# Patient Record
Sex: FEMALE | Race: WHITE | NOT HISPANIC OR LATINO | URBAN - METROPOLITAN AREA
[De-identification: names, ages, dates, MRNs, and addresses within clinical notes are randomized per-mention and may not be internally consistent; named-entity substitution may affect disease eponyms.]

---

## 2018-10-12 ENCOUNTER — EMERGENCY (EMERGENCY)
Facility: HOSPITAL | Age: 83
LOS: 1 days | Discharge: HOME | End: 2018-10-12
Admitting: EMERGENCY MEDICINE
Payer: MEDICARE

## 2018-10-12 VITALS
TEMPERATURE: 98 F | OXYGEN SATURATION: 98 % | RESPIRATION RATE: 20 BRPM | SYSTOLIC BLOOD PRESSURE: 117 MMHG | HEART RATE: 76 BPM | DIASTOLIC BLOOD PRESSURE: 68 MMHG
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
## 2018-10-12 DIAGNOSIS — Z91.018 ALLERGY TO OTHER FOODS: ICD-10-CM

## 2018-10-12 DIAGNOSIS — S50.812A ABRASION OF LEFT FOREARM, INITIAL ENCOUNTER: ICD-10-CM
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
## 2018-10-12 DIAGNOSIS — S50.811A ABRASION OF RIGHT FOREARM, INITIAL ENCOUNTER: ICD-10-CM

## 2018-10-12 DIAGNOSIS — Z96.641 PRESENCE OF RIGHT ARTIFICIAL HIP JOINT: Chronic | ICD-10-CM
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
## 2018-10-12 DIAGNOSIS — S42.101A FRACTURE OF UNSPECIFIED PART OF SCAPULA, RIGHT SHOULDER, INITIAL ENCOUNTER FOR CLOSED FRACTURE: ICD-10-CM
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
## 2018-10-12 DIAGNOSIS — S63.501A UNSPECIFIED SPRAIN OF RIGHT WRIST, INITIAL ENCOUNTER: ICD-10-CM

## 2018-10-12 DIAGNOSIS — M25.511 PAIN IN RIGHT SHOULDER: ICD-10-CM

## 2018-10-12 PROCEDURE — 99284 EMERGENCY DEPT VISIT MOD MDM: CPT | Mod: 25,57

## 2018-10-12 PROCEDURE — 73010 X-RAY EXAM OF SHOULDER BLADE: CPT | Mod: 26,RT

## 2018-10-12 PROCEDURE — 73030 X-RAY EXAM OF SHOULDER: CPT | Mod: 26,RT

## 2018-10-12 PROCEDURE — 23570 CLTX SCAPULAR FX W/O MNPJ: CPT | Mod: 54

## 2018-10-12 PROCEDURE — 73110 X-RAY EXAM OF WRIST: CPT | Mod: 26,50

## 2018-10-12 PROCEDURE — 71250 CT THORAX DX C-: CPT | Mod: 26

## 2018-10-12 NOTE — ED PROVIDER NOTE - DIAGNOSTIC INTERPRETATION
Xray (wet reads) interpreted by ALLEN MCCURDY, BANDAR   Xray shoulder/scapular   xray wrist - Xray (wet reads) interpreted by BANDAR CHATTERJEE   Xray shoulder/scapular - ?lucency over the glenoid region, no dislocation, joint space intact, no effusion noted. No foreign body noted   xray wrist - +DJD changes, no soft tissue swelling. no acute fx or dislocation, joint space intact, no effusion noted. No foreign body noted

## 2018-10-12 NOTE — ED PROVIDER NOTE - OBJECTIVE STATEMENT
82 yo F with PMHx of IBS, 84 yo F with PMHx of IBS, not on any AC, last tetanus within 5 yrs, BIBA for R shoulder and scapular and b/l wrist pain s/p fall.  Pt was walking up a ramp to a restaurant, shoes got stuck and fell.  Hit her R shoulder and tried to break her fall with b/l forearms.  Now with pain to the R shoulder, scapular, and b/l wrist region.  Denies prodromes, head trauma, LOC, HA, dizziness, SOB, CP, palpitations, N/V, change in sensation, abdominal/back/neck pain, focal weakness, change in vision/mental status/speech, paresthesia, and malaise. Pt is ambulatory s/p fall.

## 2018-10-12 NOTE — ED PROVIDER NOTE - MEDICAL DECISION MAKING DETAILS
pt with mechanical fall, no associated prodromes, no focal neuro deficits, NV intact, Xray negative for acute fx or bony injury, ACE wrap and wrist splint applied, encouraged RICE to affected region, weight bear as tolerated, f/u ortho, pt and family verbalized understanding. offered pain meds multiple times but pt refused and declined due to "adverse reactions to all pain meds" pt with mechanical fall, no associated prodromes, no focal neuro deficits, NV intact, Xray with ?lucency over R scapular, CT with R scapular fx, no other associated sx or pain on exam, bedside US with negative FAST, shoulder immobilizer applied, ACE wrap and wrist splint applied, encouraged RICE to affected region, weight bear as tolerated, f/u ortho, pt and family verbalized understanding. offered pain meds multiple times but pt refused and declined due to "adverse reactions to all pain meds"

## 2018-10-12 NOTE — ED ADULT NURSE NOTE - NSIMPLEMENTINTERV_GEN_ALL_ED
Implemented All Universal Safety Interventions:  [place] to call system. Call bell, personal items and telephone within reach. Instruct patient to call for assistance. Room bathroom lighting operational. Non-slip footwear when patient is off stretcher. Physically safe environment: no spills, clutter or unnecessary equipment. Stretcher in lowest position, wheels locked, appropriate side rails in place.

## 2018-10-12 NOTE — ED PROVIDER NOTE - CARE PROVIDER_API CALL
Zak Quiros), Orthopaedic Surgery  [address]  [address] [place] [address]  Phone: [phone]  Fax: [phone]    your pmd,   Phone: (   )    -  Fax: (   )    -

## 2018-10-12 NOTE — ED PROVIDER NOTE - PROGRESS NOTE DETAILS
offered pain meds on arrival, but pt declined all pain meds and reports adverse reactions to all pain meds, given ice pack, will proceed with xrays and supportive care

## 2018-10-12 NOTE — ED PROVIDER NOTE - CARE PLAN
Principal Discharge DX:	Shoulder sprain  Secondary Diagnosis:	Wrist sprain  Secondary Diagnosis:	Abrasion Principal Discharge DX:	Scapular fracture  Secondary Diagnosis:	Wrist sprain  Secondary Diagnosis:	Abrasion

## 2018-10-12 NOTE — ED PROVIDER NOTE - NS ED NOTE AC HIGH RISK COUNTRIES
received patient to rm 12 with c/o generalized body pain. hx sickle cell disease. reported at triage he took 8mg dilaudid po before coming to ed.
No

## 2018-10-12 NOTE — ED PROVIDER NOTE - PHYSICAL EXAMINATION
Vital Signs - nursing notes reviewed and confirmed  Gen - WDWN, NAD, comfortable and non-toxic appearing, speaking in full sentences   Skin - warm, dry, abrasion to b/l wrist/forearm region, no laceration onted   HEENT - AT/NC, PERRL, EOMI, no conjunctival injection, moist oral mucosa, TM intact b/l with good cone of lights, o/p clear with no erythema, edema, or exudate, uvula midline, airway patent, neck supple and NT, FROM  CV - S1S2, R/R/R  Resp - respiration non-labored, CTAB, symmetric bs b/l, no r/r/w  GI - NABS, soft, ND, NT, no rebound or guarding, no CVAT b/l   MS - w/w/p, R shoulder +edema and TTP posterior scapular and shoulder region, no erythema, ecchymosis, crepitus, joint laxity, or deformity, restricted ROM 2/2 pain, NV intact. +SILT, symmetric palpable distal pulses b/l , calves supple and NT, distal pulses symmetric b/l, brisk cap refills, +SILT  Neuro - AxOx3, no focal neuro deficits, CN II-XII grossly intact, cerebellar function intact, negative pronator drift, negative nystagmus, ambulatory without gait disturbance

## 2018-10-24 PROBLEM — Z00.00 ENCOUNTER FOR PREVENTIVE HEALTH EXAMINATION: Status: ACTIVE | Noted: 2018-10-24

## 2018-10-24 PROBLEM — K58.9 IRRITABLE BOWEL SYNDROME WITHOUT DIARRHEA: Chronic | Status: ACTIVE | Noted: 2018-10-12

## 2018-10-26 ENCOUNTER — APPOINTMENT (OUTPATIENT)
Dept: ORTHOPEDIC SURGERY | Facility: CLINIC | Age: 83
End: 2018-10-26
Payer: MEDICARE

## 2018-10-26 VITALS — WEIGHT: 92 LBS | RESPIRATION RATE: 16 BRPM | HEIGHT: 62 IN | BODY MASS INDEX: 16.93 KG/M2

## 2018-10-26 DIAGNOSIS — Z82.62 FAMILY HISTORY OF OSTEOPOROSIS: ICD-10-CM

## 2018-10-26 DIAGNOSIS — Z80.9 FAMILY HISTORY OF MALIGNANT NEOPLASM, UNSPECIFIED: ICD-10-CM

## 2018-10-26 DIAGNOSIS — Z87.09 PERSONAL HISTORY OF OTHER DISEASES OF THE RESPIRATORY SYSTEM: ICD-10-CM

## 2018-10-26 DIAGNOSIS — Z78.9 OTHER SPECIFIED HEALTH STATUS: ICD-10-CM

## 2018-10-26 DIAGNOSIS — Z87.39 PERSONAL HISTORY OF OTHER DISEASES OF THE MUSCULOSKELETAL SYSTEM AND CONNECTIVE TISSUE: ICD-10-CM

## 2018-10-26 DIAGNOSIS — F19.90 OTHER PSYCHOACTIVE SUBSTANCE USE, UNSPECIFIED, UNCOMPLICATED: ICD-10-CM

## 2018-10-26 PROCEDURE — 99203 OFFICE O/P NEW LOW 30 MIN: CPT

## 2018-11-04 ENCOUNTER — MOBILE ON CALL (OUTPATIENT)
Age: 83
End: 2018-11-04

## 2018-11-09 ENCOUNTER — APPOINTMENT (OUTPATIENT)
Dept: FAMILY MEDICINE | Facility: CLINIC | Age: 83
End: 2018-11-09
Payer: MEDICARE

## 2018-11-09 VITALS
DIASTOLIC BLOOD PRESSURE: 74 MMHG | BODY MASS INDEX: 17.79 KG/M2 | HEART RATE: 77 BPM | HEIGHT: 60.63 IN | SYSTOLIC BLOOD PRESSURE: 115 MMHG | OXYGEN SATURATION: 98 % | TEMPERATURE: 98.2 F | WEIGHT: 93 LBS

## 2018-11-09 DIAGNOSIS — M54.5 LOW BACK PAIN: ICD-10-CM

## 2018-11-09 DIAGNOSIS — M25.551 PAIN IN RIGHT HIP: ICD-10-CM

## 2018-11-09 PROCEDURE — 36415 COLL VENOUS BLD VENIPUNCTURE: CPT

## 2018-11-09 PROCEDURE — 99204 OFFICE O/P NEW MOD 45 MIN: CPT | Mod: 25

## 2018-11-09 NOTE — PHYSICAL EXAM
[No Acute Distress] : no acute distress [Well-Appearing] : well-appearing [Normal Sclera/Conjunctiva] : normal sclera/conjunctiva [Normal Outer Ear/Nose] : the outer ears and nose were normal in appearance [Supple] : supple [No Respiratory Distress] : no respiratory distress  [Clear to Auscultation] : lungs were clear to auscultation bilaterally [Normal Rate] : normal rate  [Regular Rhythm] : with a regular rhythm [Soft] : abdomen soft [Non Tender] : non-tender [Non-distended] : non-distended [No Masses] : no abdominal mass palpated [No HSM] : no HSM [Normal Bowel Sounds] : normal bowel sounds [No CVA Tenderness] : no CVA  tenderness [No Spinal Tenderness] : no spinal tenderness [No Joint Swelling] : no joint swelling [No Rash] : no rash [Coordination Grossly Intact] : coordination grossly intact [No Focal Deficits] : no focal deficits [Normal Affect] : the affect was normal [de-identified] : 2+ bilateral pitting edema [de-identified] : a [de-identified] : walking with cane

## 2018-11-09 NOTE — REVIEW OF SYSTEMS
[Diarrhea] : diarrhea [Muscle Pain] : muscle pain [Back Pain] : back pain [Negative] : Heme/Lymph [FreeTextEntry9] : LE swelling

## 2018-11-09 NOTE — HISTORY OF PRESENT ILLNESS
[FreeTextEntry8] : 82 yo female here for establishment of care.\par \par Patient slipped and fell on her right hip in the winter. Had surgery done of the right hip in January. Has surgery at [facility], will sign for records. Still with mobility issues since that time. Has 24 hour aid currently to help her. Recently in the last week she has noticed also that she has low back pain, which has been making it hard to get around. \par \par Patient also with IBS and chronic diarrhea. Has had accidents where she lost control of stool in the past. Also suffered with a parasitic infection in the past. Is going to make follow up appointment with her GI. Can have up to 10-15 episodes of diarrhea with some blood at times. \par \par Also with LE edema. The swelling has been worse recently since she's been less mobile. No CP or SOB.

## 2018-11-13 LAB
ALBUMIN SERPL ELPH-MCNC: 2.9 G/DL
ALP BLD-CCNC: 111 U/L
ALT SERPL-CCNC: 12 U/L
ANION GAP SERPL CALC-SCNC: 8 MMOL/L
AST SERPL-CCNC: 22 U/L
BASOPHILS # BLD AUTO: 0.01 K/UL
BASOPHILS NFR BLD AUTO: 0.1 %
BILIRUB SERPL-MCNC: 0.2 MG/DL
BUN SERPL-MCNC: 20 MG/DL
CALCIUM SERPL-MCNC: 8.7 MG/DL
CHLORIDE SERPL-SCNC: 99 MMOL/L
CO2 SERPL-SCNC: 28 MMOL/L
CREAT SERPL-MCNC: 0.54 MG/DL
EOSINOPHIL # BLD AUTO: 0.01 K/UL
EOSINOPHIL NFR BLD AUTO: 0.1 %
FERRITIN SERPL-MCNC: 38 NG/ML
FOLATE SERPL-MCNC: 16.4 NG/ML
GLUCOSE SERPL-MCNC: 81 MG/DL
HCT VFR BLD CALC: 30.9 %
HGB BLD-MCNC: 9 G/DL
IMM GRANULOCYTES NFR BLD AUTO: 0.4 %
IRON SATN MFR SERPL: 7 %
IRON SERPL-MCNC: 20 UG/DL
LYMPHOCYTES # BLD AUTO: 2.59 K/UL
LYMPHOCYTES NFR BLD AUTO: 21.3 %
MAN DIFF?: NORMAL
MCHC RBC-ENTMCNC: 26.4 PG
MCHC RBC-ENTMCNC: 29.1 GM/DL
MCV RBC AUTO: 90.6 FL
MONOCYTES # BLD AUTO: 0.99 K/UL
MONOCYTES NFR BLD AUTO: 8.1 %
NEUTROPHILS # BLD AUTO: 8.52 K/UL
NEUTROPHILS NFR BLD AUTO: 70 %
PLATELET # BLD AUTO: 356 K/UL
POTASSIUM SERPL-SCNC: 4 MMOL/L
PROT SERPL-MCNC: 6.6 G/DL
RBC # BLD: 3.41 M/UL
RBC # BLD: 3.43 M/UL
RBC # FLD: 17.4 %
RETICS # AUTO: 2.1 %
RETICS AGGREG/RBC NFR: 73.1 K/UL
SODIUM SERPL-SCNC: 135 MMOL/L
TIBC SERPL-MCNC: 278 UG/DL
TRANSFERRIN SERPL-MCNC: 198 MG/DL
UIBC SERPL-MCNC: 258 UG/DL
VIT B12 SERPL-MCNC: 1192 PG/ML
WBC # FLD AUTO: 12.17 K/UL

## 2018-11-14 LAB — G LAMBLIA AG STL QL: NORMAL

## 2018-11-15 LAB — DEPRECATED O AND P PREP STL: NORMAL

## 2018-11-16 LAB
BACTERIA STL CULT: NORMAL
E.COLI SHIGA TOXIN: NEGATIVE

## 2018-11-27 ENCOUNTER — APPOINTMENT (OUTPATIENT)
Dept: HEART AND VASCULAR | Facility: CLINIC | Age: 83
End: 2018-11-27

## 2019-01-04 ENCOUNTER — MOBILE ON CALL (OUTPATIENT)
Age: 84
End: 2019-01-04

## 2019-01-09 ENCOUNTER — MOBILE ON CALL (OUTPATIENT)
Age: 84
End: 2019-01-09

## 2019-01-10 ENCOUNTER — APPOINTMENT (OUTPATIENT)
Dept: GASTROENTEROLOGY | Facility: CLINIC | Age: 84
End: 2019-01-10

## 2019-01-10 RX ORDER — FERROUS SULFATE 300 MG/5ML
300 (60 FE) SOLUTION ORAL TWICE DAILY
Qty: 1 | Refills: 3 | Status: ACTIVE | COMMUNITY
Start: 2019-01-10 | End: 1900-01-01

## 2019-01-10 RX ORDER — CHLORHEXIDINE GLUCONATE 4 %
325 (65 FE) LIQUID (ML) TOPICAL
Qty: 60 | Refills: 3 | Status: DISCONTINUED | COMMUNITY
Start: 2018-11-13 | End: 2019-01-10

## 2019-01-11 ENCOUNTER — APPOINTMENT (OUTPATIENT)
Dept: HEART AND VASCULAR | Facility: CLINIC | Age: 84
End: 2019-01-11

## 2019-01-15 ENCOUNTER — APPOINTMENT (OUTPATIENT)
Dept: FAMILY MEDICINE | Facility: CLINIC | Age: 84
End: 2019-01-15
Payer: MEDICARE

## 2019-01-15 VITALS
WEIGHT: 96 LBS | DIASTOLIC BLOOD PRESSURE: 68 MMHG | BODY MASS INDEX: 18.85 KG/M2 | HEART RATE: 81 BPM | HEIGHT: 60 IN | OXYGEN SATURATION: 97 % | SYSTOLIC BLOOD PRESSURE: 109 MMHG

## 2019-01-15 PROCEDURE — 99214 OFFICE O/P EST MOD 30 MIN: CPT

## 2019-01-15 NOTE — HISTORY OF PRESENT ILLNESS
[de-identified] : 82 yo female here for follow up. \par \par Patient has h/o multiple falls in the past. Patient stating she saw ortho who advised her to walk tall and as normally as possible at home and only use the cane outside of her home. Patient stating that recently she has had an aid coming earlier in the morning to help her get around the house and getting dressed, which has helped. Here with aid today. Patient got new shoes with thick soles, which seems to help her get around as well. Also working on getting an automatic elevator soon.\par \par Stating that her diarrhea has improved. Had GI stool panel which was negative. Hasn't seen GI yet since last visit.\par \par Patient with anemia stable at last visit. Hasn't taken iron yet because was waiting on solution to arrive at pharmacy. Patient hasn't seen Heme yet.\par \par Patient still with LE swelling. Hasn't gone yet for LE dopplers.

## 2019-01-15 NOTE — PHYSICAL EXAM
[No Acute Distress] : no acute distress [Well-Appearing] : well-appearing [Normal Sclera/Conjunctiva] : normal sclera/conjunctiva [Normal Outer Ear/Nose] : the outer ears and nose were normal in appearance [Supple] : supple [No Respiratory Distress] : no respiratory distress  [Clear to Auscultation] : lungs were clear to auscultation bilaterally [Normal Rate] : normal rate  [Regular Rhythm] : with a regular rhythm [No CVA Tenderness] : no CVA  tenderness [No Spinal Tenderness] : no spinal tenderness [No Joint Swelling] : no joint swelling [No Rash] : no rash [Coordination Grossly Intact] : coordination grossly intact [No Focal Deficits] : no focal deficits [Normal Affect] : the affect was normal [de-identified] : 2+ bilateral pitting edema [de-identified] : walking with cane

## 2019-01-28 ENCOUNTER — APPOINTMENT (OUTPATIENT)
Dept: HEART AND VASCULAR | Facility: CLINIC | Age: 84
End: 2019-01-28

## 2019-01-30 ENCOUNTER — MOBILE ON CALL (OUTPATIENT)
Age: 84
End: 2019-01-30

## 2019-02-04 ENCOUNTER — APPOINTMENT (OUTPATIENT)
Dept: FAMILY MEDICINE | Facility: CLINIC | Age: 84
End: 2019-02-04
Payer: MEDICARE

## 2019-02-04 VITALS
DIASTOLIC BLOOD PRESSURE: 79 MMHG | SYSTOLIC BLOOD PRESSURE: 122 MMHG | TEMPERATURE: 98.6 F | BODY MASS INDEX: 19.01 KG/M2 | OXYGEN SATURATION: 96 % | HEART RATE: 90 BPM | WEIGHT: 96.8 LBS | HEIGHT: 60 IN

## 2019-02-04 DIAGNOSIS — S42.101A FRACTURE OF UNSPECIFIED PART OF SCAPULA, RIGHT SHOULDER, INITIAL ENCOUNTER FOR CLOSED FRACTURE: ICD-10-CM

## 2019-02-04 DIAGNOSIS — D64.9 ANEMIA, UNSPECIFIED: ICD-10-CM

## 2019-02-04 PROCEDURE — 99214 OFFICE O/P EST MOD 30 MIN: CPT

## 2019-02-04 NOTE — HISTORY OF PRESENT ILLNESS
[de-identified] : 84 yo female here for follow up. \par \par Patient has h/o multiple mechanical falls in the past. Is seeing an integrative physician who wants her to have a CT of her head. No head trauma. Patient will send records from this physician. Patient stating that recently she has had an aid coming earlier in the morning to help her get around the house and getting dressed, which has helped. Here with aid today. Patient did have a fall back in October and had a CT chest which showed right scapular fx. No longer with pain there, but admitting to occasional R sided rib pain since then. No SOB or CP.\par \par Stating that her diarrhea has improved. Had GI stool panel which was negative. Has appt with GI tomorrow.\par \par Patient with anemia stable at last visit. Patient only just started on iron tabs. Patient hasn't seen Heme yet.\par \par Patient still with LE swelling. Hasn't gone yet for LE dopplers.

## 2019-02-04 NOTE — PHYSICAL EXAM
[No Acute Distress] : no acute distress [Well-Appearing] : well-appearing [Normal Sclera/Conjunctiva] : normal sclera/conjunctiva [Normal Outer Ear/Nose] : the outer ears and nose were normal in appearance [Supple] : supple [No Respiratory Distress] : no respiratory distress  [Clear to Auscultation] : lungs were clear to auscultation bilaterally [Normal Rate] : normal rate  [Regular Rhythm] : with a regular rhythm [No CVA Tenderness] : no CVA  tenderness [No Spinal Tenderness] : no spinal tenderness [No Joint Swelling] : no joint swelling [No Rash] : no rash [Coordination Grossly Intact] : coordination grossly intact [No Focal Deficits] : no focal deficits [Normal Affect] : the affect was normal [de-identified] : 2+ bilateral pitting edema [de-identified] : minimal pain with palpation of right 8-10th ribs, anteriorly [de-identified] : walking with cane

## 2019-02-05 ENCOUNTER — NON-APPOINTMENT (OUTPATIENT)
Age: 84
End: 2019-02-05

## 2019-02-05 ENCOUNTER — APPOINTMENT (OUTPATIENT)
Dept: HEART AND VASCULAR | Facility: CLINIC | Age: 84
End: 2019-02-05
Payer: MEDICARE

## 2019-02-05 VITALS
WEIGHT: 96 LBS | SYSTOLIC BLOOD PRESSURE: 120 MMHG | HEART RATE: 82 BPM | HEIGHT: 60 IN | DIASTOLIC BLOOD PRESSURE: 81 MMHG | OXYGEN SATURATION: 97 % | BODY MASS INDEX: 18.85 KG/M2

## 2019-02-05 DIAGNOSIS — R53.83 OTHER FATIGUE: ICD-10-CM

## 2019-02-05 PROCEDURE — 99204 OFFICE O/P NEW MOD 45 MIN: CPT

## 2019-02-05 NOTE — REASON FOR VISIT
[Initial Evaluation] : an initial evaluation of [Chest Pain] : chest pain [Dyspnea] : dyspnea [Fatigue] : feeling tired (fatigue) [FreeTextEntry1] : 83 year old female presents to initiate care. She does have a touch of edema b/l. She is a poor historian. She has had prior testing. However, we do not have results available. No chest discomfort. However, she remarks on fatigue. She is also short of breath. This is often noted with activity. Symptoms always improve at rest.\par

## 2019-02-05 NOTE — PHYSICAL EXAM
[General Appearance - Well Developed] : well developed [Normal Appearance] : normal appearance [Well Groomed] : well groomed [General Appearance - Well Nourished] : well nourished [No Deformities] : no deformities [General Appearance - In No Acute Distress] : no acute distress [Normal Conjunctiva] : the conjunctiva exhibited no abnormalities [Eyelids - No Xanthelasma] : the eyelids demonstrated no xanthelasmas [Normal Oral Mucosa] : normal oral mucosa [No Oral Pallor] : no oral pallor [No Oral Cyanosis] : no oral cyanosis [Normal Jugular Venous A Waves Present] : normal jugular venous A waves present [Normal Jugular Venous V Waves Present] : normal jugular venous V waves present [No Jugular Venous Jo A Waves] : no jugular venous jo A waves [Heart Rate And Rhythm] : heart rate and rhythm were normal [Heart Sounds] : normal S1 and S2 [Murmurs] : no murmurs present [Respiration, Rhythm And Depth] : normal respiratory rhythm and effort [Exaggerated Use Of Accessory Muscles For Inspiration] : no accessory muscle use [Auscultation Breath Sounds / Voice Sounds] : lungs were clear to auscultation bilaterally [Abdomen Soft] : soft [Abdomen Tenderness] : non-tender [Abdomen Mass (___ Cm)] : no abdominal mass palpated [Abnormal Walk] : normal gait [Gait - Sufficient For Exercise Testing] : the gait was sufficient for exercise testing [Nail Clubbing] : no clubbing of the fingernails [Cyanosis, Localized] : no localized cyanosis [Petechial Hemorrhages (___cm)] : no petechial hemorrhages [] : no ischemic changes [Oriented To Time, Place, And Person] : oriented to person, place, and time [Affect] : the affect was normal [Mood] : the mood was normal [No Anxiety] : not feeling anxious

## 2019-02-08 ENCOUNTER — APPOINTMENT (OUTPATIENT)
Dept: GASTROENTEROLOGY | Facility: CLINIC | Age: 84
End: 2019-02-08
Payer: MEDICARE

## 2019-02-08 ENCOUNTER — APPOINTMENT (OUTPATIENT)
Dept: HEART AND VASCULAR | Facility: CLINIC | Age: 84
End: 2019-02-08
Payer: MEDICARE

## 2019-02-08 VITALS
OXYGEN SATURATION: 95 % | WEIGHT: 96 LBS | TEMPERATURE: 98.6 F | SYSTOLIC BLOOD PRESSURE: 111 MMHG | HEART RATE: 82 BPM | BODY MASS INDEX: 18.85 KG/M2 | DIASTOLIC BLOOD PRESSURE: 76 MMHG | HEIGHT: 60 IN

## 2019-02-08 DIAGNOSIS — Z87.19 PERSONAL HISTORY OF OTHER DISEASES OF THE DIGESTIVE SYSTEM: ICD-10-CM

## 2019-02-08 DIAGNOSIS — R14.0 ABDOMINAL DISTENSION (GASEOUS): ICD-10-CM

## 2019-02-08 DIAGNOSIS — K92.1 MELENA: ICD-10-CM

## 2019-02-08 PROCEDURE — 93306 TTE W/DOPPLER COMPLETE: CPT

## 2019-02-08 PROCEDURE — 99204 OFFICE O/P NEW MOD 45 MIN: CPT | Mod: 25

## 2019-02-08 PROCEDURE — 36415 COLL VENOUS BLD VENIPUNCTURE: CPT

## 2019-02-08 RX ORDER — RIFAXIMIN 550 MG/1
550 TABLET ORAL
Qty: 42 | Refills: 0 | Status: ACTIVE | COMMUNITY
Start: 2019-02-08 | End: 1900-01-01

## 2019-02-08 RX ORDER — CHOLESTYRAMINE 4 G/9G
4 POWDER, FOR SUSPENSION ORAL TWICE DAILY
Qty: 360 | Refills: 5 | Status: ACTIVE | COMMUNITY
Start: 2019-02-08 | End: 1900-01-01

## 2019-02-19 RX ORDER — IMMUNE GLOB/PLASMA FRA BOVINE 5 G
5 POWDER IN PACKET (EA) ORAL
Qty: 60 | Refills: 2 | Status: ACTIVE | COMMUNITY
Start: 2018-09-06

## 2019-03-06 ENCOUNTER — APPOINTMENT (OUTPATIENT)
Dept: HEART AND VASCULAR | Facility: CLINIC | Age: 84
End: 2019-03-06
Payer: MEDICARE

## 2019-03-06 VITALS
SYSTOLIC BLOOD PRESSURE: 110 MMHG | BODY MASS INDEX: 18.85 KG/M2 | HEIGHT: 60 IN | HEART RATE: 89 BPM | DIASTOLIC BLOOD PRESSURE: 69 MMHG | OXYGEN SATURATION: 97 % | WEIGHT: 96 LBS | TEMPERATURE: 98.2 F

## 2019-03-06 DIAGNOSIS — Z80.0 FAMILY HISTORY OF MALIGNANT NEOPLASM OF DIGESTIVE ORGANS: ICD-10-CM

## 2019-03-06 DIAGNOSIS — R07.89 OTHER CHEST PAIN: ICD-10-CM

## 2019-03-06 DIAGNOSIS — Z83.79 FAMILY HISTORY OF OTHER DISEASES OF THE DIGESTIVE SYSTEM: ICD-10-CM

## 2019-03-06 DIAGNOSIS — R06.00 DYSPNEA, UNSPECIFIED: ICD-10-CM

## 2019-03-06 PROCEDURE — 99214 OFFICE O/P EST MOD 30 MIN: CPT

## 2019-03-07 PROBLEM — R07.89 ATYPICAL CHEST PAIN: Status: ACTIVE | Noted: 2019-03-07

## 2019-03-07 NOTE — REASON FOR VISIT
[Follow-Up - Clinic] : a clinic follow-up of [Chest Pain] : chest pain [FreeTextEntry1] : Notes atypical chest discomfort. This was noted after being positioned in a certain way. Echo findings were normal. No dyspnea or palpitations noted. No syncope.

## 2019-03-07 NOTE — PHYSICAL EXAM
[General Appearance - Well Developed] : well developed [Normal Appearance] : normal appearance [Well Groomed] : well groomed [General Appearance - Well Nourished] : well nourished [No Deformities] : no deformities [General Appearance - In No Acute Distress] : no acute distress [Normal Conjunctiva] : the conjunctiva exhibited no abnormalities [Eyelids - No Xanthelasma] : the eyelids demonstrated no xanthelasmas [Normal Oral Mucosa] : normal oral mucosa [No Oral Pallor] : no oral pallor [No Oral Cyanosis] : no oral cyanosis [Normal Jugular Venous A Waves Present] : normal jugular venous A waves present [Normal Jugular Venous V Waves Present] : normal jugular venous V waves present [No Jugular Venous Jo A Waves] : no jugular venous jo A waves [Respiration, Rhythm And Depth] : normal respiratory rhythm and effort [Exaggerated Use Of Accessory Muscles For Inspiration] : no accessory muscle use [Auscultation Breath Sounds / Voice Sounds] : lungs were clear to auscultation bilaterally [Heart Rate And Rhythm] : heart rate and rhythm were normal [Heart Sounds] : normal S1 and S2 [Murmurs] : no murmurs present [Abdomen Soft] : soft [Abdomen Tenderness] : non-tender [Abdomen Mass (___ Cm)] : no abdominal mass palpated [Abnormal Walk] : normal gait [Gait - Sufficient For Exercise Testing] : the gait was sufficient for exercise testing [Nail Clubbing] : no clubbing of the fingernails [Cyanosis, Localized] : no localized cyanosis [Petechial Hemorrhages (___cm)] : no petechial hemorrhages [] : no ischemic changes [Oriented To Time, Place, And Person] : oriented to person, place, and time [Affect] : the affect was normal [Mood] : the mood was normal [No Anxiety] : not feeling anxious

## 2019-03-07 NOTE — DISCUSSION/SUMMARY
[FreeTextEntry1] : Inclined towards a conservative follow up in this patient. We had a careful discussion regarding diet and exercise. Will be happy to re-evaluate.\par

## 2019-03-18 ENCOUNTER — MOBILE ON CALL (OUTPATIENT)
Age: 84
End: 2019-03-18

## 2019-03-18 ENCOUNTER — MEDICATION RENEWAL (OUTPATIENT)
Age: 84
End: 2019-03-18

## 2019-03-22 ENCOUNTER — APPOINTMENT (OUTPATIENT)
Dept: GASTROENTEROLOGY | Facility: CLINIC | Age: 84
End: 2019-03-22

## 2019-03-29 ENCOUNTER — APPOINTMENT (OUTPATIENT)
Dept: FAMILY MEDICINE | Facility: CLINIC | Age: 84
End: 2019-03-29
Payer: MEDICARE

## 2019-03-29 VITALS
SYSTOLIC BLOOD PRESSURE: 125 MMHG | DIASTOLIC BLOOD PRESSURE: 84 MMHG | BODY MASS INDEX: 17.94 KG/M2 | HEIGHT: 60 IN | HEART RATE: 85 BPM | OXYGEN SATURATION: 98 % | WEIGHT: 91.38 LBS | TEMPERATURE: 97.2 F

## 2019-03-29 DIAGNOSIS — M79.89 OTHER SPECIFIED SOFT TISSUE DISORDERS: ICD-10-CM

## 2019-03-29 DIAGNOSIS — R14.0 ABDOMINAL DISTENSION (GASEOUS): ICD-10-CM

## 2019-03-29 DIAGNOSIS — R19.7 DIARRHEA, UNSPECIFIED: ICD-10-CM

## 2019-03-29 DIAGNOSIS — R41.3 OTHER AMNESIA: ICD-10-CM

## 2019-03-29 DIAGNOSIS — R29.898 OTHER SYMPTOMS AND SIGNS INVOLVING THE MUSCULOSKELETAL SYSTEM: ICD-10-CM

## 2019-03-29 PROCEDURE — 99214 OFFICE O/P EST MOD 30 MIN: CPT

## 2019-03-29 NOTE — HISTORY OF PRESENT ILLNESS
[de-identified] : 84 yo female with PMH of iron deficiency anemia, small intestinal bacterial overgrowth, asthma, LE edema, fatigue, memory impairment, muscle deconditioning, and IBS here for evaluation of abdominal distension. Patient's RN called because patient was having severe abdominal distension. Advised patient to go to the ED but she refused going. Admitting to daily diarrhea every hour. Tried outpatient stool testing, but patient didn't provide all samples. Stating she has h/o parasitic infection in the past which was treated. Admitting to abdominal bloating and discomfort, as well as back pain. Admitting to occasional blood in stool. Last BM 1 hour ago.\par \par Patient with chronic LE edema. Have tried to get patient to go for LE Doppler, but has difficulty getting there. Gets around with walker and home aid. Had echo 2/8/19 showing EF 60-65%, grade I diastolic CHF, mild aortic regurg. When trying to have clinical coordinator assist her with appointments or VNS services visit her, patient doesn't always answer the phone or door.\par \par Spoke with patients daughter earlier this month. Patient's daughter, Patricia Mcmahon [phone]. Daughter stating that her mom fired all her home aids and had been living at home alone recently. Daughter is HCP and contacted another home aid service. They evaluated her and stated that there were feces over the walls, patient has increased LE swelling, and fridge is at temperature too high to preserve food. Patricia also stating that she has also had worsening mental status and dementia.

## 2019-03-29 NOTE — REVIEW OF SYSTEMS
[Abdominal Pain] : abdominal pain [Diarrhea] : diarrhea [Negative] : Heme/Lymph [FreeTextEntry9] : LE swelling

## 2019-03-29 NOTE — PHYSICAL EXAM
[No Acute Distress] : no acute distress [Well-Appearing] : well-appearing [Normal Sclera/Conjunctiva] : normal sclera/conjunctiva [Normal Outer Ear/Nose] : the outer ears and nose were normal in appearance [Supple] : supple [No Respiratory Distress] : no respiratory distress  [Clear to Auscultation] : lungs were clear to auscultation bilaterally [Normal Rate] : normal rate  [Regular Rhythm] : with a regular rhythm [No CVA Tenderness] : no CVA  tenderness [No Spinal Tenderness] : no spinal tenderness [No Joint Swelling] : no joint swelling [No Rash] : no rash [Coordination Grossly Intact] : coordination grossly intact [No Focal Deficits] : no focal deficits [Speech Grossly Normal] : speech grossly normal [Normal Affect] : the affect was normal [de-identified] : 2+ bilateral pitting edema [de-identified] : abdomen very distended, mild tenderness [de-identified] : walking with walker [de-identified] : Patient has circuitous train of thought, very forgetful

## 2019-04-01 ENCOUNTER — MOBILE ON CALL (OUTPATIENT)
Age: 84
End: 2019-04-01

## 2019-04-08 ENCOUNTER — APPOINTMENT (OUTPATIENT)
Dept: FAMILY MEDICINE | Facility: CLINIC | Age: 84
End: 2019-04-08

## 2019-04-08 ENCOUNTER — INPATIENT (INPATIENT)
Facility: HOSPITAL | Age: 84
LOS: 3 days | Discharge: HOME HEALTH | DRG: 377 | End: 2019-04-12
Attending: STUDENT IN AN ORGANIZED HEALTH CARE EDUCATION/TRAINING PROGRAM | Admitting: STUDENT IN AN ORGANIZED HEALTH CARE EDUCATION/TRAINING PROGRAM
Payer: MEDICARE

## 2019-04-08 VITALS
HEART RATE: 74 BPM | WEIGHT: 91.05 LBS | TEMPERATURE: 98 F | DIASTOLIC BLOOD PRESSURE: 68 MMHG | SYSTOLIC BLOOD PRESSURE: 112 MMHG | OXYGEN SATURATION: 94 % | RESPIRATION RATE: 16 BRPM

## 2019-04-08 DIAGNOSIS — Z96.641 PRESENCE OF RIGHT ARTIFICIAL HIP JOINT: Chronic | ICD-10-CM

## 2019-04-08 LAB
ALBUMIN SERPL ELPH-MCNC: 1.9 G/DL — LOW (ref 3.4–5)
ALP SERPL-CCNC: 114 U/L — SIGNIFICANT CHANGE UP (ref 40–120)
ALT FLD-CCNC: 16 U/L — SIGNIFICANT CHANGE UP (ref 12–42)
ANION GAP SERPL CALC-SCNC: 9 MMOL/L — SIGNIFICANT CHANGE UP (ref 9–16)
APPEARANCE UR: CLEAR — SIGNIFICANT CHANGE UP
APTT BLD: 30.8 SEC — SIGNIFICANT CHANGE UP (ref 27.5–36.3)
AST SERPL-CCNC: 16 U/L — SIGNIFICANT CHANGE UP (ref 15–37)
BASOPHILS NFR BLD AUTO: 0.2 % — SIGNIFICANT CHANGE UP (ref 0–2)
BILIRUB SERPL-MCNC: 0.2 MG/DL — SIGNIFICANT CHANGE UP (ref 0.2–1.2)
BILIRUB UR-MCNC: NEGATIVE — SIGNIFICANT CHANGE UP
BUN SERPL-MCNC: 16 MG/DL — SIGNIFICANT CHANGE UP (ref 7–23)
CALCIUM SERPL-MCNC: 7.9 MG/DL — LOW (ref 8.5–10.5)
CHLORIDE SERPL-SCNC: 104 MMOL/L — SIGNIFICANT CHANGE UP (ref 96–108)
CO2 SERPL-SCNC: 25 MMOL/L — SIGNIFICANT CHANGE UP (ref 22–31)
COLOR SPEC: YELLOW — SIGNIFICANT CHANGE UP
CREAT SERPL-MCNC: 0.6 MG/DL — SIGNIFICANT CHANGE UP (ref 0.5–1.3)
DIFF PNL FLD: ABNORMAL
EOSINOPHIL NFR BLD AUTO: 0.1 % — SIGNIFICANT CHANGE UP (ref 0–6)
GLUCOSE SERPL-MCNC: 116 MG/DL — HIGH (ref 70–99)
GLUCOSE UR QL: NEGATIVE — SIGNIFICANT CHANGE UP
HCT VFR BLD CALC: 28.6 % — LOW (ref 34.5–45)
HGB BLD-MCNC: 8.3 G/DL — LOW (ref 11.5–15.5)
IMM GRANULOCYTES NFR BLD AUTO: 1.1 % — SIGNIFICANT CHANGE UP (ref 0–1.5)
INR BLD: 1.02 — SIGNIFICANT CHANGE UP (ref 0.88–1.16)
KETONES UR-MCNC: NEGATIVE — SIGNIFICANT CHANGE UP
LEUKOCYTE ESTERASE UR-ACNC: NEGATIVE — SIGNIFICANT CHANGE UP
LIDOCAIN IGE QN: 109 U/L — SIGNIFICANT CHANGE UP (ref 73–393)
LYMPHOCYTES # BLD AUTO: 13.3 % — SIGNIFICANT CHANGE UP (ref 13–44)
MCHC RBC-ENTMCNC: 25.1 PG — LOW (ref 27–34)
MCHC RBC-ENTMCNC: 29 G/DL — LOW (ref 32–36)
MCV RBC AUTO: 86.4 FL — SIGNIFICANT CHANGE UP (ref 80–100)
MONOCYTES NFR BLD AUTO: 17.5 % — HIGH (ref 2–14)
NEUTROPHILS NFR BLD AUTO: 67.8 % — SIGNIFICANT CHANGE UP (ref 43–77)
NITRITE UR-MCNC: NEGATIVE — SIGNIFICANT CHANGE UP
OB PNL STL: POSITIVE
PH UR: 6.5 — SIGNIFICANT CHANGE UP (ref 5–8)
PLATELET # BLD AUTO: 288 K/UL — SIGNIFICANT CHANGE UP (ref 150–400)
POTASSIUM SERPL-MCNC: 3.8 MMOL/L — SIGNIFICANT CHANGE UP (ref 3.5–5.3)
POTASSIUM SERPL-SCNC: 3.8 MMOL/L — SIGNIFICANT CHANGE UP (ref 3.5–5.3)
PROT SERPL-MCNC: 6.2 G/DL — LOW (ref 6.4–8.2)
PROT UR-MCNC: NEGATIVE MG/DL — SIGNIFICANT CHANGE UP
PROTHROM AB SERPL-ACNC: 11.3 SEC — SIGNIFICANT CHANGE UP (ref 10–12.9)
RBC # BLD: 3.31 M/UL — LOW (ref 3.8–5.2)
RBC # FLD: 17.2 % — HIGH (ref 10.3–14.5)
SODIUM SERPL-SCNC: 138 MMOL/L — SIGNIFICANT CHANGE UP (ref 132–145)
SP GR SPEC: 1.01 — SIGNIFICANT CHANGE UP (ref 1–1.03)
UROBILINOGEN FLD QL: 0.2 E.U./DL — SIGNIFICANT CHANGE UP
WBC # BLD: 11.1 K/UL — HIGH (ref 3.8–10.5)
WBC # FLD AUTO: 11.1 K/UL — HIGH (ref 3.8–10.5)

## 2019-04-08 PROCEDURE — 99223 1ST HOSP IP/OBS HIGH 75: CPT | Mod: GC

## 2019-04-08 PROCEDURE — 99285 EMERGENCY DEPT VISIT HI MDM: CPT

## 2019-04-08 RX ORDER — SODIUM CHLORIDE 9 MG/ML
1000 INJECTION INTRAMUSCULAR; INTRAVENOUS; SUBCUTANEOUS ONCE
Qty: 0 | Refills: 0 | Status: COMPLETED | OUTPATIENT
Start: 2019-04-08 | End: 2019-04-08

## 2019-04-08 RX ORDER — PANTOPRAZOLE SODIUM 20 MG/1
8 TABLET, DELAYED RELEASE ORAL
Qty: 80 | Refills: 0 | Status: DISCONTINUED | OUTPATIENT
Start: 2019-04-08 | End: 2019-04-08

## 2019-04-08 RX ADMIN — SODIUM CHLORIDE 1000 MILLILITER(S): 9 INJECTION INTRAMUSCULAR; INTRAVENOUS; SUBCUTANEOUS at 12:54

## 2019-04-08 RX ADMIN — PANTOPRAZOLE SODIUM 10 MG/HR: 20 TABLET, DELAYED RELEASE ORAL at 14:02

## 2019-04-08 NOTE — ED PROVIDER NOTE - NS ED ROS FT
Other than symptoms associated with present events the following is reported:  General:  No fever, no chills, no weight loss. + generalized weakness  HEENT:  No sore throat.  Respiratory: No cough, no dyspnea, no wheeze.  Cardiovascular:  No chest pain, no palpitations, no orthopnea.  GI: No abdominal pain, no nausea/vomiting, no diarrhea. +bloody stools. no melena  : No dysuria, no frequency, no urgency.  Musculoskeletal:  No joint pain, no myalgia.  Endocrine:  No generalized weakness, no polyuria.  Neurological:  No headache, no focal weakness.   Psychiatric: No emotional stress, no depression.  Derm:  No rash.  Heme:  No bruising, no bleeding.

## 2019-04-08 NOTE — ED PROVIDER NOTE - CLINICAL SUMMARY MEDICAL DECISION MAKING FREE TEXT BOX
Pt with BRBPR passing clots. C/o generalized weakness. Given heparin at BI 3 days ago. Will make pt NPO. Will get cbc, cmp, lipase, AU, INR. will get guaiac. PPI drip started. Likely admission for anemia and GI bleeding. Will also need a neuropysch workup while admitted given PCP (Dr. Al) concern for worsening dementia and safety at home.

## 2019-04-08 NOTE — ED PROVIDER NOTE - PROGRESS NOTE DETAILS
hgb8.3- dropped from last 9 (prior to iron supplementation). 2x bloody stools here- BRB in toilet with small red clots. abd exam benign. Will admit to medicine- discussed with Dr. Gardner via transfer center. Pt is accepted to [facility].

## 2019-04-08 NOTE — ED PROVIDER NOTE - OBJECTIVE STATEMENT
84 y/o F     pmhx: IBS, GI bleed, IDAnemia, asthma, LE edema, memory impairment  -sent last week to BI for abd distension/pain 3/29 - heparinized for edematous legs while admitted for DVT prophylaxis   last CBC 11/2018 hgb 9    Pt sent to Ed for GI bleeding. 82 y/o F     pmhx: IBS, GI bleed, IDAnemia, asthma, LE edema, memory impairment  -sent last week to  for abd distension/pain 3/29 - heparinized for edematous legs while admitted for DVT prophylaxis   last CBC 11/2018 hgb 9    Pt sent to Ed for GI bleeding. Discussed with PCP this AM that she was feeling generalized weakness and that she had been noting BRB in toilet bowl with red clots. Pt known to be anemic. Of note was at  for 3 days for admission for Abd pain and distension on 3/29. During that time was heparinized for DVT prophylaxis. Pt states bleeding began the day after she was discharged from . Discussed case with Dr. Al - PCP- states pt also needs to be admitted for a full neuro psychology workup for worsening dementia and confusion. Pt has no other medical complaint at this time. Denies abd pain, fevers, chills, n/v/d/c, cp, sob, grissom, palpitations, lightheadedness

## 2019-04-08 NOTE — ED ADULT NURSE REASSESSMENT NOTE - NS ED NURSE REASSESS COMMENT FT1
pt alert and awake, no complaints at this time, aide at bedside. protonix drip running as per orders.

## 2019-04-08 NOTE — ED PROVIDER NOTE - PHYSICAL EXAMINATION
VITAL SIGNS: I have reviewed nursing notes and confirm.  CONSTITUTIONAL: Well-developed; cachetic appearing; in no acute distress.  SKIN: Pale. Skin is warm and dry, no acute rash.  HEAD: Normocephalic; atraumatic.  EYES: PERRL, EOM intact; conjunctiva and sclera clear.  ENT: No nasal discharge; airway clear.  NECK: Supple; non tender.  CARD: S1, S2 normal; no murmurs, gallops, or rubs. Regular rate and rhythm.  RESP: No wheezes, rales or rhonchi.  ABD: Normal bowel sounds; soft; non-distended; non-tender; no hepatosplenomegaly.  RECTAL EXAM: no visible or palpable internal/external hemorrhoids. + bright red blood in rectal vault- +small clots. no oozing or active bleeding.   EXT: Normal ROM. No clubbing, cyanosis or edema.  NEURO: Alert, oriented. Grossly unremarkable.  PSYCH: agitated, easily confused.

## 2019-04-08 NOTE — ED PROVIDER NOTE - ATTENDING CONTRIBUTION TO CARE
Patient seen with ALLEN Mcgovern in bed 22 of [facility] and to be admitted to [facility]

## 2019-04-08 NOTE — ED ADULT NURSE REASSESSMENT NOTE - NS ED NURSE REASSESS COMMENT FT1
Patient received from SUSANNA Ash. IV Protonix infusing well. Patient noted with Stage 2 Sacral Ulcer 2 x 2 and +1 pitting Edema gita legs. NAD will cont to monitor

## 2019-04-08 NOTE — ED ADULT NURSE NOTE - PRIMARY CARE PROVIDER
[facility] Medicine Services  PROGRESS NOTE    Patient Name: Leo Elias  : 1936  MRN: [id number]    Date of Admission: 2019  Length of Stay: 1  Primary Care Physician: Provider, No Known    Subjective   Subjective     CC:  Fever of 103 on admission    HPI:  Wife Cate in room today.  Feels better.  They say he has already been seen by Surgeon.  Afebrile today but was febrile on admission.  Leukocytosis improved    Review of Systems    Gen- No fevers, chills  CV- No chest pain, palpitations  Resp- No cough, dyspnea  GI- No N/V/D, abd pain    Otherwise ROS is negative except as mentioned in the HPI.    Objective   Objective     Vital Signs:   Temp:  [98.1 °F (36.7 °C)-103 °F (39.4 °C)] 98.1 °F (36.7 °C)  Heart Rate:  [] 79  Resp:  [18-20] 20  BP: ()/(47-89) 111/74     Physical Exam:    Constitutional: No acute distress, awake, alert  HENT: NCAT, mucous membranes moist  Respiratory: Clear to auscultation bilaterally, respiratory effort normal   Cardiovascular: RRR, no murmurs, rubs, or gallops, palpable pedal pulses bilaterally  Gastrointestinal: Positive bowel sounds, soft, nontender, nondistended  Musculoskeletal: no edema, left ankle cellulitis with heel ulcer  Psychiatric: Appropriate affect, cooperative  Neurologic: Oriented x 3, strength symmetric in all extremities, Cranial Nerves grossly intact to confrontation, speech clear  Skin: No rashes    Results Reviewed:  I have personally reviewed current lab, radiology, and data and agree.    Results from last 7 days   Lab Units  19   0526  19   1827   WBC 10*3/mm3  12.30*  18.20*   HEMOGLOBIN g/dL  10.0*  10.6*   HEMATOCRIT %  30.7*  32.3*   PLATELETS 10*3/mm3  221  270     Results from last 7 days   Lab Units  19   0526  19   1827   SODIUM mmol/L  133  132   POTASSIUM mmol/L  4.0  3.8   CHLORIDE mmol/L  106  104   CO2 mmol/L  18.0*  18.0*   BUN mg/dL  80*  86*   CREATININE mg/dL  2.31*   2.38*   GLUCOSE mg/dL  95  170*   CALCIUM mg/dL  8.2*  8.4*   ALT (SGPT) U/L   --   10   AST (SGOT) U/L   --   13     Estimated Creatinine Clearance: 31.7 mL/min (A) (by C-G formula based on SCr of 2.31 mg/dL (H)).    No results found for: BNP    Microbiology Results Abnormal     Procedure Component Value - Date/Time    Blood Culture - Blood, Arm, Right [[id number]] Collected:  01/12/19 1904    Lab Status:  Preliminary result Specimen:  Blood from Arm, Right Updated:  01/13/19 0800     Blood Culture No growth at less than 24 hours    Blood Culture - Blood, Arm, Left [[id number]] Collected:  01/12/19 1904    Lab Status:  Preliminary result Specimen:  Blood from Arm, Left Updated:  01/13/19 0800     Blood Culture No growth at less than 24 hours    Urine Culture - Urine, Urine, Catheter In/Out [[id number]]  (Normal) Collected:  01/12/19 1921    Lab Status:  Preliminary result Specimen:  Urine, Catheter In/Out Updated:  01/13/19 0709     Urine Culture No growth    Influenza A & B, RT PCR - Swab, Nasopharynx [[id number]]  (Normal) Collected:  01/12/19 1842    Lab Status:  Final result Specimen:  Swab from Nasopharynx Updated:  01/1936     Influenza A PCR Not Detected     Influenza B PCR Not Detected        Imaging Results (last 24 hours)     Procedure Component Value Units Date/Time    CT Lower Extremity Left Without Contrast [[id number]] Collected:  01/13/19 0149     Updated:  01/13/19 0256    Narrative:       EXAM:    CT Left Lower Extremity Without IV Contrast, Foot     EXAM DATE/TIME:    1/13/2019 1:49 AM     CLINICAL HISTORY:    82 years old, male; Sepsis, unspecified organism; Pleural effusion, not   elsewhere classified; Constipation, unspecified; Pressure ulcer of left heel,   stage 3; Urinary tract infection, site not specified; Other ascites; Fever,   unspecified; Pain; Foot; Additional info: Left foot wound, R/O osteo     TECHNIQUE:    CT of the Left lower extremity without intravenous contrast was performed.    Exam focused on the foot.    All CT scans at this facility use at least one of these dose optimization   techniques: automated exposure control; mA and/or kV adjustment per patient   size (includes targeted exams where dose is matched to clinical indication); or   iterative reconstruction.    Coronal and sagittal reformatted images were created and reviewed.     COMPARISON:    No relevant prior studies available.     FINDINGS:    Bones/joints:  Inferior and superior calcaneal osteophytes. DJD of ankle   joint, subtalar joint, multiple mid-tarsal joints, multiple MTP joints, several   IP joints of toes. Some generalized osseous demineralization. Some scattered   degenerative subarticular cysts of ankle and multiple bones of the foot. No   obvious large osseous erosions, but subtle acute demineralization abnormality   could be obscured. If further study is desired, then consider MRI with/without   IV contrast. No dislocation or obvious acute fracture. Degenerative changes and   some scattered demineralization of right ankle and foot, as visualized. Hallux   valgus- metatarsus varus deformity.    Soft tissues:  Skin ulcer with skin irregularity, granular calcifications, and   thickening along posterior heel. Edema/swelling of left ankle and foot. Cannot   exclude cellulitis at heel.  Correlation with CT contrast study or ultrasound   could help rule out any small fluid pocket within the swollen soft tissues of   the heel-distal Achilles region, if desired..  Achilles tendon appears grossly   continuous, but MR correlation would be more precise.    Vasculature:  Atherosclerotic placques scattered along some vessels.       Impression:       1. Skin ulcer with skin irregularity and thickening along posterior heel.    Cannot exclude cellulitis. See above.  2. Soft tissue swelling of ankle and foot.   3. Inferior and superior calcaneal osteophytes.   4. DJD of ankle joint, subtalar joint, multiple mid-tarsal joints,  NONAFFILIATED multiple MTP   joints, several IP joints of toes.   5. Some generalized osseous demineralization.  Also, see above.  Sheath   6. No dislocation or obvious acute fracture.   7. Hallux valgus- metatarsus varus deformity.   8. Scattered atherosclerosis.  9.  Additional findings, as above.     THIS DOCUMENT HAS BEEN ELECTRONICALLY SIGNED BY HOLDEN BACON MD    CT Abdomen Pelvis Without Contrast [[id number]] Collected:  01/12/19 1945     Updated:  01/12/19 2153    Addenda:        Note: Critical Findings were discussed with Jacky Roy at 1/12/2019   9:49   PM EST. The report was understood and acknowledged by the healthcare   giver, who   stated patient has elevated WBCs, no history of recent fall or   anticoagulants.    THIS DOCUMENT HAS BEEN ELECTRONICALLY SIGNED BY HOLDEN BACON MD  Signed:  01/12/19 2153 by Holden Bacon MD    Narrative:       EXAM:    CT Abdomen and Pelvis Without Contrast     EXAM DATE/TIME:    1/12/2019 7:45 PM     CLINICAL HISTORY:    82 years old, male; Pain; Abdominal pain; Generalized; Prior surgery;   Additional info: Abdominal distention with vomiting and fever     TECHNIQUE:    Axial computed tomography images of the abdomen and pelvis without contrast.    All CT scans at this facility use at least one of these dose optimization   techniques: automated exposure control; mA and/or kV adjustment per patient   size (includes targeted exams where dose is matched to clinical indication); or   iterative reconstruction.    Coronal reformatted images were created and reviewed.     COMPARISON:    No relevant prior studies available.     FINDINGS:    Lower thorax:  Old granulomatous disease of lung(s). There are coronary artery   atheromatous calcifications, consistent with CAD. Dystrophic calcifications on   the mitral anulus. Normal heart size. Moderate right pleural effusion. Possible   tiny noncalcified 2 mm nodules versus vessels at lateral right lower lobe   base.( For  patients at low risk (minimal or absent history of smoking and of   other known risk factors), no routine follow-up is indicated. For patients at   high risk (history of smoking or of other known risk factors), consider   optional CT at 12 months. (Magy et al., Fleischner Society, 2017).).    Scattered bibasilar subsegmental atelectasis. Mild RLL compressive atelectasis   and/or infiltrate. Some punctate high density possible debris or additional   calcifications in base of right lower lobe. Small hiatal hernia.     ABDOMEN:    Liver:  Evidence of old granulomatous disease of the liver. No hepatomegaly.    Gallbladder and bile ducts: Unremarkable. No calcified stones. No ductal   dilation.    Pancreas:  Pancreatic atrophy.    Spleen: Vascular calcifications. No splenomegaly.    Adrenals:  Borderline in fullness of bilateral adrenals.    Kidneys and ureters:  Mild bilateral perinephric cobwebs/edema. Mild left   renal cortical scarring. No hydronephrosis or nephrolithiasis. No obstructive   uropathy.    Stomach and bowel:  Moderate fecal ball in distended rectum with mild wall   thickening bordered by mild edema, suspect mild stercoral colitis from mild   constipation. There is at least a moderate amount of colonic feces. Gas   scattered in nondilated small bowel. Nonspecific bowel gas. Moderate distention   of stomach containing air and fluid.    Appendix:  Appendix obscured.    Retroperitoneal space:  Oval 3.9 cm x 6.7 cm x 9.1 cm soft tissue density in   right retroperitoneum posterior to the right kidney-parapsoas region bordered   by hazy  density/edema could represent fluid collection such as hematoma or   abscess. No bubbles of air within this abnormal area. Possible slight swelling   of right psoas muscle. Ultrasound may be helpful to confirm suspected internal   fluid. Clinically correlate. Followup recommended.     PELVIS:    Bladder:  Subtotally contracted bladder restricts wall evaluation. No  calculi.    Reproductive:  Enlarged prostate with transverse diameter of 5.7 cm.    Subperitoneal space:  Mild perirectal edema.     ABDOMEN and PELVIS:    Intraperitoneal space:  No significant ascites.    Bones/joints:  Mild scoliosis. Degenerative changes of the spine. DJD of   lateral hips. Mild DJD of bilateral sacroiliac joints. Chronic fracture L2   vertebral body.    Soft tissues:  Mild anasarca. Small 1.1 cm patchy or nodular density in   subcutaneous fat flank RLQ. Scattered areas of mild skin thickening along the   anterior pelvis. Mild laxity of right flank abdominal wall. Mild anasarca.   Slightly more prominent edema or contusion in right flank subcutaneous fat.    Vasculature:  Atherosclerotic placques scattered along some vessels. No aortic   aneurysm. Lower abdominal aortic stent present.    Lymph nodes:  Calcified granulomata within mediastinal lymph node(s) from old   granulomatous disease.  No enlarged pelvic or abdominal lymph nodes.      Impression:       1. CAD.   2. Moderate right pleural effusion.   3. Bibasilar subsegmental atelectasis. Mild RLL compressive atelectasis and/or   infiltrate.   4. Constipation with mild rectal stercoral colitis.   5. Prostate enlargement.   6. Small hiatal hernia.   7. Pancreatic atrophy.   8. Nonspecific bowel gas pattern.   9. Right retroperitoneal 3.9 cm x 6.7 cm x 9.1 cm oval abnormality, possible   fluid collection such as hematoma or abscess. See above.   10. Mild anasarca. Slightly more prominent edema or contusion in right flank   subcutaneous fat.   11.  Suspect mild swelling of the right psoas muscle.  12.  Additional findings, as above.     THIS DOCUMENT HAS BEEN ELECTRONICALLY SIGNED BY HOLDEN BACON MD           I have reviewed the medications:    Current Facility-Administered Medications:   •  dextrose (D50W) 25 g/ 50mL Intravenous Solution 25 g, 25 g, Intravenous, Q15 Min PRN, Criss Miller, APRN  •  dextrose (GLUTOSE) oral gel 15 g, 15  g, Oral, Q15 Min PRN, Criss Miller APRN  •  glucagon (human recombinant) (GLUCAGEN DIAGNOSTIC) injection 1 mg, 1 mg, Subcutaneous, PRN, Criss Miller APRN  •  heparin (porcine) 5000 UNIT/ML injection 5,000 Units, 5,000 Units, Subcutaneous, Q12H, Criss Miller APRN, 5,000 Units at 01/13/19 0923  •  hydrOXYzine (ATARAX) tablet 25 mg, 25 mg, Oral, BID, Criss Miller APRN, 25 mg at 01/13/19 0922  •  insulin lispro (humaLOG) injection 0-7 Units, 0-7 Units, Subcutaneous, 4x Daily With Meals & Nightly, Criss Miller APRN, Stopped at 01/13/19 0732  •  lactobacillus acidophilus (RISAQUAD) capsule 1 capsule, 1 capsule, Oral, Daily, Criss Miller APRN, 1 capsule at 01/13/19 0922  •  levothyroxine (SYNTHROID, LEVOTHROID) tablet 50 mcg, 50 mcg, Oral, Daily, Criss Miller APRN  •  melatonin sublingual tablet 5 mg, 5 mg, Sublingual, Nightly PRN, Criss Miller APRN, 5 mg at 01/13/19 0137  •  ondansetron (ZOFRAN) injection 4 mg, 4 mg, Intravenous, Q6H PRN, Criss Miller APRN, 4 mg at 01/13/19 0539  •  Pharmacy to dose vancomycin, , Does not apply, Continuous PRN, Criss Miller APRN  •  piperacillin-tazobactam (ZOSYN) 3.375 g in iso-osmotic dextrose 50 ml (premix), 3.375 g, Intravenous, Q8H, Criss Miller APRN, 3.375 g at 01/13/19 0944  •  sodium chloride 0.9 % flush 10 mL, 10 mL, Intravenous, PRN, Jacky Dubois MD  •  sodium chloride 0.9 % flush 3 mL, 3 mL, Intravenous, Q12H, Criss Miller APRN, 3 mL at 01/13/19 0137  •  sodium chloride 0.9 % flush 3-10 mL, 3-10 mL, Intravenous, PRN, Criss Miller APRN  •  Sodium chloride 0.9 % infusion, 100 mL/hr, Intravenous, Continuous, Criss Miller APRN, Last Rate: 100 mL/hr at 01/13/19 0137, 100 mL/hr at 01/13/19 0137  •  tamsulosin (FLOMAX) 24 hr capsule 0.8 mg, 0.8 mg, Oral, Nightly, Thee Hernandez MD  •  vancomycin (dosing per levels), , Does not apply, Daily, [place],  [place] [facility], [facility]      Assessment/Plan   Assessment / Plan     Active Hospital Problems    Diagnosis Date Noted   • **Sepsis, unspecified organism (CMS/[facility]) [A41.9] 01/12/2019   • Hypocalcemia [E83.51] 01/12/2019   • Acute renal failure superimposed on stage 3 chronic kidney disease (CMS/[facility]) [N17.9, N18.3] 01/12/2019   • Sepsis due to urinary tract infection (CMS/[facility]) [A41.9, N39.0] 01/12/2019   • Type 2 diabetes mellitus (CMS/[facility]) [E11.9] 01/12/2019   • Hypothyroidism (acquired) [E03.9] 01/12/2019   • Essential hypertension [I10] 01/12/2019   • Wound healing, delayed, left foot [T14.8XXD] 01/12/2019   • Acute UTI (urinary tract infection) [N39.0] 01/12/2019     Brief Hospital Course to date:  Leo Elias is a 82 y.o. male with severe sepsis POA    82 year old male presents to the ED accompanied by wife with complaints of nausea/vomiting and fever that began around 3 am this morning.      1) Sepsis per criteria  -tmax: 103, WBC  18.20, lactic acid 2.3, procal 3.14  -source not completely clear but has multiple potential sources: suspect CT of abdomen and pelvis concerning for retroperitoneal abscess vs hematoma, patient has chronic non-healing left foot wound  and underlying UTI.  -will continue empiric vancomycin and zosyn  -may need further assistance from ID   -blood cultures pending  -urine cultures pending  -continue IVF     2) Retroperitoneal abscess vs hematoma  -noted on CT of abdomen and pelvis  -H&H stable  -more concerning for abscess with SIRS presentation  -General Surgery Evaluation     3) Acute renal failure superimposed on CKD stage III  -baseline creatinine 1.4-1.7 currently 2.23  -likely multifactorial, recently started on bactrim  -will check urine studies  -hold nephrotoxic medications  -repeat labs in am     4) complicated UTI  -UA as noted above  -continue abx as mentioned above  -urine cultures pending  -follow urine cultures      5) Left foot wound  -being followed by home health with wet to dry  dressing daily  -will CT left foot to rule out underlying osteomyelitis    -consult WOC     6) Type 2 diabetes mellitus   -check hgb a1c  -start ldssi  -fingersticks achs     7) Hypothyroidism  -continue home levothyroxine  -check TSH     8) Hypocalcemia  -check ionized calcium     ID consult on Monday    DVT Prophylaxis:  Heparin 5,000 Unit SC every 12 hours    Disposition: I expect the patient to be discharged TBD    CODE STATUS:   Code Status and Medical Interventions:   Ordered at: 01/12/19 6600     Limited Support to NOT Include:    Intubation     Level Of Support Discussed With:    Patient     Code Status:    No CPR     Medical Interventions (Level of Support Prior to Arrest):    Limited     Electronically signed by Thee Hernandez MD, 01/13/19, 4:30 PM.

## 2019-04-08 NOTE — ED ADULT NURSE NOTE - NSIMPLEMENTINTERV_GEN_ALL_ED
Implemented All Fall Risk Interventions:  [place] to call system. Call bell, personal items and telephone within reach. Instruct patient to call for assistance. Room bathroom lighting operational. Non-slip footwear when patient is off stretcher. Physically safe environment: no spills, clutter or unnecessary equipment. Stretcher in lowest position, wheels locked, appropriate side rails in place. Provide visual cue, wrist band, yellow gown, etc. Monitor gait and stability. Monitor for mental status changes and reorient to person, place, and time. Review medications for side effects contributing to fall risk. Reinforce activity limits and safety measures with patient and family.

## 2019-04-08 NOTE — ED ADULT NURSE NOTE - OBJECTIVE STATEMENT
pt is a 84 y/o female presents to the ED with HHA for rectal bleeding x 1 one day. as per HHA, noticed BRB in toilet. as per pt, was recently seen/discharged from  and was heparinized. pt in NAD, appears comfortable A,A&Ox2. will continue to monitor.

## 2019-04-08 NOTE — ED PROVIDER NOTE - NS ED MD EM SELECTION
[id number] Comprehensive No, the patient is not being discharged from [facility]

## 2019-04-08 NOTE — ED ADULT TRIAGE NOTE - AS TEMP SITE
Per Dr. Martell review of 3/1 labs showing total cholesterol 162, HDL 59, LDL 79, Triglycerides 120: Very Good! CPM.    Discussed results and recommendations to CPM with patient. She verbalized understanding and agreement to all. She states she is following up with her PCP today because after she saw Dr. Martell she got sick and was diagnosed with a UTI, her BP was low in the office when she saw the APN. She states she is not having any current problems or issues with dizziness and will call the office with any problems or concerns.     
oral

## 2019-04-09 ENCOUNTER — TRANSCRIPTION ENCOUNTER (OUTPATIENT)
Age: 84
End: 2019-04-09

## 2019-04-09 DIAGNOSIS — D64.9 ANEMIA, UNSPECIFIED: ICD-10-CM

## 2019-04-09 DIAGNOSIS — Z91.89 OTHER SPECIFIED PERSONAL RISK FACTORS, NOT ELSEWHERE CLASSIFIED: ICD-10-CM

## 2019-04-09 DIAGNOSIS — R60.0 LOCALIZED EDEMA: ICD-10-CM

## 2019-04-09 DIAGNOSIS — R63.8 OTHER SYMPTOMS AND SIGNS CONCERNING FOOD AND FLUID INTAKE: ICD-10-CM

## 2019-04-09 DIAGNOSIS — F43.21 ADJUSTMENT DISORDER WITH DEPRESSED MOOD: ICD-10-CM

## 2019-04-09 DIAGNOSIS — R62.7 ADULT FAILURE TO THRIVE: ICD-10-CM

## 2019-04-09 DIAGNOSIS — K62.5 HEMORRHAGE OF ANUS AND RECTUM: ICD-10-CM

## 2019-04-09 DIAGNOSIS — E43 UNSPECIFIED SEVERE PROTEIN-CALORIE MALNUTRITION: ICD-10-CM

## 2019-04-09 DIAGNOSIS — F32.9 MAJOR DEPRESSIVE DISORDER, SINGLE EPISODE, UNSPECIFIED: ICD-10-CM

## 2019-04-09 DIAGNOSIS — Z29.9 ENCOUNTER FOR PROPHYLACTIC MEASURES, UNSPECIFIED: ICD-10-CM

## 2019-04-09 DIAGNOSIS — R45.1 RESTLESSNESS AND AGITATION: ICD-10-CM

## 2019-04-09 DIAGNOSIS — F03.90 UNSPECIFIED DEMENTIA WITHOUT BEHAVIORAL DISTURBANCE: ICD-10-CM

## 2019-04-09 DIAGNOSIS — D50.0 IRON DEFICIENCY ANEMIA SECONDARY TO BLOOD LOSS (CHRONIC): ICD-10-CM

## 2019-04-09 LAB
ALBUMIN SERPL ELPH-MCNC: 2.3 G/DL — LOW (ref 3.3–5)
ALP SERPL-CCNC: 98 U/L — SIGNIFICANT CHANGE UP (ref 40–120)
ALT FLD-CCNC: 9 U/L — LOW (ref 10–45)
ANION GAP SERPL CALC-SCNC: 7 MMOL/L — SIGNIFICANT CHANGE UP (ref 5–17)
ANION GAP SERPL CALC-SCNC: 8 MMOL/L — SIGNIFICANT CHANGE UP (ref 5–17)
AST SERPL-CCNC: 11 U/L — SIGNIFICANT CHANGE UP (ref 10–40)
BILIRUB SERPL-MCNC: 0.3 MG/DL — SIGNIFICANT CHANGE UP (ref 0.2–1.2)
BLD GP AB SCN SERPL QL: NEGATIVE — SIGNIFICANT CHANGE UP
BLD GP AB SCN SERPL QL: NEGATIVE — SIGNIFICANT CHANGE UP
BUN SERPL-MCNC: 10 MG/DL — SIGNIFICANT CHANGE UP (ref 7–23)
BUN SERPL-MCNC: 13 MG/DL — SIGNIFICANT CHANGE UP (ref 7–23)
CALCIUM SERPL-MCNC: 8 MG/DL — LOW (ref 8.4–10.5)
CALCIUM SERPL-MCNC: 8.1 MG/DL — LOW (ref 8.4–10.5)
CHLORIDE SERPL-SCNC: 104 MMOL/L — SIGNIFICANT CHANGE UP (ref 96–108)
CHLORIDE SERPL-SCNC: 106 MMOL/L — SIGNIFICANT CHANGE UP (ref 96–108)
CO2 SERPL-SCNC: 26 MMOL/L — SIGNIFICANT CHANGE UP (ref 22–31)
CO2 SERPL-SCNC: 26 MMOL/L — SIGNIFICANT CHANGE UP (ref 22–31)
CREAT SERPL-MCNC: 0.51 MG/DL — SIGNIFICANT CHANGE UP (ref 0.5–1.3)
CREAT SERPL-MCNC: 0.56 MG/DL — SIGNIFICANT CHANGE UP (ref 0.5–1.3)
FOLATE SERPL-MCNC: 8.7 NG/ML — SIGNIFICANT CHANGE UP
GLUCOSE SERPL-MCNC: 87 MG/DL — SIGNIFICANT CHANGE UP (ref 70–99)
GLUCOSE SERPL-MCNC: 98 MG/DL — SIGNIFICANT CHANGE UP (ref 70–99)
HCT VFR BLD CALC: 26.2 % — LOW (ref 34.5–45)
HCT VFR BLD CALC: 28.2 % — LOW (ref 34.5–45)
HCT VFR BLD CALC: 28.3 % — LOW (ref 34.5–45)
HGB BLD-MCNC: 7.8 G/DL — LOW (ref 11.5–15.5)
HGB BLD-MCNC: 8.1 G/DL — LOW (ref 11.5–15.5)
HGB BLD-MCNC: 8.3 G/DL — LOW (ref 11.5–15.5)
MAGNESIUM SERPL-MCNC: 2.2 MG/DL — SIGNIFICANT CHANGE UP (ref 1.6–2.6)
MCHC RBC-ENTMCNC: 25.9 PG — LOW (ref 27–34)
MCHC RBC-ENTMCNC: 26.4 PG — LOW (ref 27–34)
MCHC RBC-ENTMCNC: 26.4 PG — LOW (ref 27–34)
MCHC RBC-ENTMCNC: 28.7 GM/DL — LOW (ref 32–36)
MCHC RBC-ENTMCNC: 29.3 GM/DL — LOW (ref 32–36)
MCHC RBC-ENTMCNC: 29.8 GM/DL — LOW (ref 32–36)
MCV RBC AUTO: 88.8 FL — SIGNIFICANT CHANGE UP (ref 80–100)
MCV RBC AUTO: 90.1 FL — SIGNIFICANT CHANGE UP (ref 80–100)
MCV RBC AUTO: 90.1 FL — SIGNIFICANT CHANGE UP (ref 80–100)
NRBC # BLD: 0 /100 WBCS — SIGNIFICANT CHANGE UP (ref 0–0)
PLATELET # BLD AUTO: 267 K/UL — SIGNIFICANT CHANGE UP (ref 150–400)
PLATELET # BLD AUTO: 290 K/UL — SIGNIFICANT CHANGE UP (ref 150–400)
PLATELET # BLD AUTO: 306 K/UL — SIGNIFICANT CHANGE UP (ref 150–400)
POTASSIUM SERPL-MCNC: 3.8 MMOL/L — SIGNIFICANT CHANGE UP (ref 3.5–5.3)
POTASSIUM SERPL-MCNC: 4 MMOL/L — SIGNIFICANT CHANGE UP (ref 3.5–5.3)
POTASSIUM SERPL-SCNC: 3.8 MMOL/L — SIGNIFICANT CHANGE UP (ref 3.5–5.3)
POTASSIUM SERPL-SCNC: 4 MMOL/L — SIGNIFICANT CHANGE UP (ref 3.5–5.3)
PROT SERPL-MCNC: 6.1 G/DL — SIGNIFICANT CHANGE UP (ref 6–8.3)
RBC # BLD: 2.95 M/UL — LOW (ref 3.8–5.2)
RBC # BLD: 3.13 M/UL — LOW (ref 3.8–5.2)
RBC # BLD: 3.14 M/UL — LOW (ref 3.8–5.2)
RBC # FLD: 16.9 % — HIGH (ref 10.3–14.5)
RBC # FLD: 17.1 % — HIGH (ref 10.3–14.5)
RBC # FLD: 17.3 % — HIGH (ref 10.3–14.5)
RH IG SCN BLD-IMP: POSITIVE — SIGNIFICANT CHANGE UP
RH IG SCN BLD-IMP: POSITIVE — SIGNIFICANT CHANGE UP
SODIUM SERPL-SCNC: 138 MMOL/L — SIGNIFICANT CHANGE UP (ref 135–145)
SODIUM SERPL-SCNC: 139 MMOL/L — SIGNIFICANT CHANGE UP (ref 135–145)
TSH SERPL-MCNC: 3.97 UIU/ML — SIGNIFICANT CHANGE UP (ref 0.35–4.94)
VIT B12 SERPL-MCNC: 1077 PG/ML — SIGNIFICANT CHANGE UP (ref 232–1245)
WBC # BLD: 10.96 K/UL — HIGH (ref 3.8–10.5)
WBC # BLD: 13.11 K/UL — HIGH (ref 3.8–10.5)
WBC # BLD: 9.91 K/UL — SIGNIFICANT CHANGE UP (ref 3.8–10.5)
WBC # FLD AUTO: 10.96 K/UL — HIGH (ref 3.8–10.5)
WBC # FLD AUTO: 13.11 K/UL — HIGH (ref 3.8–10.5)
WBC # FLD AUTO: 9.91 K/UL — SIGNIFICANT CHANGE UP (ref 3.8–10.5)

## 2019-04-09 PROCEDURE — 99233 SBSQ HOSP IP/OBS HIGH 50: CPT

## 2019-04-09 PROCEDURE — 99223 1ST HOSP IP/OBS HIGH 75: CPT

## 2019-04-09 RX ORDER — PANTOPRAZOLE SODIUM 20 MG/1
40 TABLET, DELAYED RELEASE ORAL EVERY 12 HOURS
Qty: 0 | Refills: 0 | Status: DISCONTINUED | OUTPATIENT
Start: 2019-04-09 | End: 2019-04-09

## 2019-04-09 RX ORDER — PANTOPRAZOLE SODIUM 20 MG/1
40 TABLET, DELAYED RELEASE ORAL ONCE
Qty: 0 | Refills: 0 | Status: COMPLETED | OUTPATIENT
Start: 2019-04-09 | End: 2019-04-09

## 2019-04-09 RX ORDER — FERROUS SULFATE 325(65) MG
0 TABLET ORAL
Qty: 0 | Refills: 0 | COMMUNITY

## 2019-04-09 RX ORDER — SODIUM CHLORIDE 9 MG/ML
1000 INJECTION INTRAMUSCULAR; INTRAVENOUS; SUBCUTANEOUS
Qty: 0 | Refills: 0 | Status: DISCONTINUED | OUTPATIENT
Start: 2019-04-09 | End: 2019-04-10

## 2019-04-09 RX ORDER — FERROUS SULFATE 325(65) MG
300 TABLET ORAL EVERY 12 HOURS
Qty: 0 | Refills: 0 | Status: DISCONTINUED | OUTPATIENT
Start: 2019-04-09 | End: 2019-04-09

## 2019-04-09 RX ADMIN — SODIUM CHLORIDE 40 MILLILITER(S): 9 INJECTION INTRAMUSCULAR; INTRAVENOUS; SUBCUTANEOUS at 02:07

## 2019-04-09 RX ADMIN — PANTOPRAZOLE SODIUM 40 MILLIGRAM(S): 20 TABLET, DELAYED RELEASE ORAL at 09:56

## 2019-04-09 RX ADMIN — SODIUM CHLORIDE 40 MILLILITER(S): 9 INJECTION INTRAMUSCULAR; INTRAVENOUS; SUBCUTANEOUS at 18:35

## 2019-04-09 NOTE — PHYSICAL THERAPY INITIAL EVALUATION ADULT - ADDITIONAL COMMENTS
Pt. reports living in an elevator building with few steps to enter. Pt. reports using mostly QC. Pt. herself is an unreliable historian and has been providing inconsistent information in regards to the amount of HHA received at home. Per chart review, pt. has 12 hrs/day x 7 days of private hire HHA and owns a walker in addition to QC.

## 2019-04-09 NOTE — DISCHARGE NOTE PROVIDER - CARE PROVIDER_API CALL
Ze Montesinos; MBBS)  Internal Medicine  [address] [place] [address]  Phone: [phone]  Fax: [phone]  Follow Up Time:     Cathy Al ()  [place] [address]  Phone: [phone]  Fax: [phone]  Follow Up Time: Ze Montesinos; MBBS)  Internal Medicine  [address] [place] [address]  Phone: [phone]  Fax: [phone]  Follow Up Time:     Cathy Al ([place])  [place] [address]  Phone: [phone]  Fax: [phone]  Follow Up Time:     Brendan Dillard  [address] [place] [address]   [address] [place] [address]  Phone: [phone]  Fax: (   )    -  Follow Up Time: 1 week

## 2019-04-09 NOTE — CONSULT NOTE ADULT - SUBJECTIVE AND OBJECTIVE BOX
Patient is a 83y old  Female who presents with a chief complaint of BRBPR (2019 08:46)       HPI:  This is a 83 year old female with pmhx of iron deficiency anemia, hemorrhoids, osteoporsis, R hip replacement LE edema, dementia who presents with BRBPR. The patient is a poor historian and was difficult to assess history and why she was in the hospital. The patient states that about in March, she started to have abdominal pain and distention prompting her to go to the ED at [facility]. She states that nothing happened there and she was discharged home. However, per the records, the patient had LE edema, was given DVT ppx and then discharged home as her workup was negative at that time. She then has had blood in the bowl of her water, not mixed in with stool that she noted at home. She also notes that she has been feeling more weak than usual, but has had a good appetite. Her PMD sent her in for possible GIB and neuropysch workup in setting of DM. The patient currently denies any symptoms besides feeling tired, LE edema that has improved and not having seen a doctor in person to talk to. Denies fever, chills, SOB, CP, abdominal pain, v/n/d/c, urinary changes, numbness or tingling in his extremities.    Of note, the patient has seen GI and cardiology in the past. Has had negative workup, with exam showing anal fissure and hemorrhoids. Declined colonoscopy on prior admission to OSH, and states that she was okay. She had a hip replacement after a fall at home, no complications from procedure. She notes that she needs aid at home as she cannot perform her ADLs    In the ED: Vitals: T 98.2, HR 74, /68, RR 16, 94%SpO2. Labs sig for WBC 11.1, Hgb 8.3, albumin 1.9, FOBT positive. Pt received 1L NS started on PPI drip and sent to [facility].      At [facility]: T 98.1, HR 75, /73, RR 18, SpO2 97%. (2019 00:22)      PAST MEDICAL & SURGICAL HISTORY:  IBS (irritable bowel syndrome)  History of hip replacement, total, right      MEDICATIONS  (STANDING):  sodium chloride 0.9%. 1000 milliLiter(s) (40 mL/Hr) IV Continuous <Continuous>    MEDICATIONS  (PRN):      Social History: lives alone in an elevator accessible apartment building, has a daughter who lives in [place], has private hire aide 12 hrs x 7 days/week    Functional Level Prior to Admission: partial assistance with bathing, walks with a quad cane, has walker    FAMILY HISTORY:  No pertinent family history in first degree relatives      CBC Full  -  ( 2019 15:17 )  WBC Count : 13.11 K/uL  RBC Count : 3.13 M/uL  Hemoglobin : 8.1 g/dL  Hematocrit : 28.2 %  Platelet Count - Automated : 306 K/uL  Mean Cell Volume : 90.1 fl  Mean Cell Hemoglobin : 25.9 pg  Mean Cell Hemoglobin Concentration : 28.7 gm/dL  Auto Neutrophil # : x  Auto Lymphocyte # : x  Auto Monocyte # : x  Auto Eosinophil # : x  Auto Basophil # : x  Auto Neutrophil % : x  Auto Lymphocyte % : x  Auto Monocyte % : x  Auto Eosinophil % : x  Auto Basophil % : x          138  |  104  |  10  ----------------------------<  87  3.8   |  26  |  0.51    Ca    8.1<L>      2019 07:32  Mg     2.2         TPro  6.1  /  Alb  2.3<L>  /  TBili  0.3  /  DBili  x   /  AST  11  /  ALT  9<L>  /  AlkPhos  98        Urinalysis Basic - ( 2019 15:03 )    Color: Yellow / Appearance: Clear / S.015 / pH: x  Gluc: x / Ketone: NEGATIVE  / Bili: NEGATIVE / Urobili: 0.2 E.U./dL   Blood: x / Protein: NEGATIVE mg/dL / Nitrite: NEGATIVE   Leuk Esterase: NEGATIVE / RBC: < 5 /HPF / WBC < 5 /HPF   Sq Epi: x / Non Sq Epi: 0-5 /HPF / Bacteria: Few /HPF          Radiology:              Vital Signs Last 24 Hrs  T(C): 36.7 (2019 08:59), Max: 37.1 (2019 18:35)  T(F): 98.1 (2019 08:59), Max: 98.8 (2019 18:35)  HR: 89 (2019 08:59) (66 - 89)  BP: 119/69 (2019 08:59) (110/58 - 122/71)  BP(mean): --  RR: 17 (2019 08:59) (17 - 18)  SpO2: 97% (2019 08:59) (94% - 98%)    REVIEW OF SYSTEMS:    CONSTITUTIONAL:  fatigue  EYES: No eye pain, visual disturbances, or discharge  ENMT:  No difficulty hearing, tinnitus, vertigo; No sinus or throat pain  NECK: No pain or stiffness  BREASTS: No pain, masses, or nipple discharge  RESPIRATORY: No cough, wheezing, chills or hemoptysis; No shortness of breath  CARDIOVASCULAR: No chest pain, palpitations, dizziness, or leg swelling  GASTROINTESTINAL: No abdominal or epigastric pain. No nausea, vomiting, or hematemesis; No diarrhea or constipation. No melena or hematochezia.  GENITOURINARY: No dysuria, frequency, hematuria, or incontinence  NEUROLOGICAL: No headaches, memory loss, loss of strength, numbness, or tremors  SKIN: No itching, burning, rashes, or lesions   LYMPH NODES: No enlarged glands  ENDOCRINE: No heat or cold intolerance; No hair loss  MUSCULOSKELETAL: No joint pain or swelling; No muscle, back, or extremity pain  PSYCHIATRIC: No depression, anxiety, mood swings, or difficulty sleeping  HEME/LYMPH: No easy bruising, or bleeding gums  ALLERGY AND IMMUNOLOGIC: No hives or eczema  VASCULAR: no swelling, erythema      Physical Exam: cachectic 84 yo  woman lying in semi Alexnadra's position, c/o feeling weak/tired    Head: normocephalic, atraumatic    Eyes: PERRLA, EOMI, no nystagmus, sclera anicteric    ENT: nasal discharge, uvula midline, no oropharyngeal erythema/exudate    Neck: supple, negative JVD, negative carotid bruits, no thyromegaly    Chest: CTA bilaterally, neg wheeze, rhonchi, rales, crackles, egophany    Cardiovascular: regular rate and rhythm, neg murmurs/rubs/gallops    Abdomen: soft, non distended, non tender, negative rebound/guarding, normal bowel sounds, neg hepatosplenomegaly    Extremities: 1 + LE edema, negative calf tenderness to palpation, negative Jayne's sign, St 2 sacral decubitus w/o purulence    :     Neurologic Exam:    Alert and oriented to person, place, date/year, speech fluent w/o dysarthria, recent and remote memory intact, repetition intact, comprehension intact,     Cranial Nerves:     II:                       pupils equal, round and reactive to light, visual fields intact   III/ IV/VI:            extraocular movements intact, neg nystagmus, ptosis  V:                       facial sensation intact, V1-3 normal  VII:                     face symmetric, no droop, normal eye closure and smile  VIII:                    hearing intact to finger rub bilaterally  IX/ X:                 soft palate rise symmetrical  XI:                      head turning, shoulder shrug normal  XII:                     tongue midline    Motor Exam:    Upper Extremities:     RIght:   no focal weakness               negative drift    Left :     no focal weakness               negative drift    Lower Extremities:                 Right:     no focal weakness    Left:        no focal weakness      Sensory:    intact to LT/PP in all UE/LE dermatomes    DTR:            = biceps/     triceps/     brachioradialis                      = patella/   medial hamstring/ankle                      neg clonus                      neg Babinski                      neg Hoffmans    Finger to Nose:  wnl    Heel to Shin:  wnl    Rapid Alternating movements:  wnl    Joint Position Sense:  intact    Romberg:  not tested    Tandem Walking:  not tested    Gait:  not tested        PM&R Impression:    1) deconditioned  2) no focal weakness  3) BRBPR/anemia        Recommendations:    1) Physical therapy focusing on therapeutic exercises, bed mobility/transfer out of bed evaluation, progressive ambulation with assistive devices prn.    2) Anticipated Disposition Plan/Recs: d/c home with home physical therapy, resume home care services

## 2019-04-09 NOTE — DISCHARGE NOTE PROVIDER - HOSPITAL COURSE
This is a 83 year old female with pmhx of iron deficiency anemia, hemorrhoids, osteoporsis, R hip replacement LE edema, dementia who presents with BRBPR. The patient is a poor historian and was difficult to assess history and why she was in the hospital. In the ED: Vitals: T 98.2, HR 74, /68, RR 16, 94%SpO2. Labs sig for WBC 11.1, Hgb 8.3, albumin 1.9, FOBT positive. Pt received 1L NS started on PPI drip and sent to [facility]. At [facility]: T 98.1, HR 75, /73, RR 18, SpO2 97%. GI consulted, patient to have flex sigmoidoscopy as she is refusing colonoscopy. Per the nurse, the patient told the nurse that she sometimes felt like hurting herself.  Patient stated that she doesn't have a plan now but she is "developing a plan." Results of sigmoidoscopy... This is a 83 year old female with pmhx of iron deficiency anemia, hemorrhoids, osteoporsis, R hip replacement LE edema, dementia who presents with BRBPR. The patient is a poor historian and was difficult to assess history and why she was in the hospital. In the ED: Vitals: T 98.2, HR 74, /68, RR 16, 94%SpO2. Labs sig for WBC 11.1, Hgb 8.3, albumin 1.9, FOBT positive. Pt received 1L NS started on PPI drip and sent to [facility]. At [facility]: T 98.1, HR 75, /73, RR 18, SpO2 97%. GI consulted, patient to have flex sigmoidoscopy as she is refusing colonoscopy. Per the nurse, the patient told the nurse that she sometimes felt like hurting herself.  Patient stated that she doesn't have a plan now but she is "developing a plan." When asked later, pt says it was a joke. Psych consulted and says pt is in no danger to herself or others. Results of sigmoidoscopy shows finding suggestive of IBD. Hydrocortisone enema ordered however patient has refused all steroid therapy as she think this is all diet related and does not want medications. Patient is cleared for discharge with follow up to GI. This is a 83 year old female with pmhx of iron deficiency anemia, hemorrhoids, osteoporsis, R hip replacement LE edema, dementia who presents with BRBPR. The patient is a poor historian and was difficult to assess history and why she was in the hospital. In the ED: Vitals: T 98.2, HR 74, /68, RR 16, 94%SpO2. Labs sig for WBC 11.1, Hgb 8.3, albumin 1.9, FOBT positive. Pt received 1L NS started on PPI drip and sent to [facility]. At [facility]: T 98.1, HR 75, /73, RR 18, SpO2 97%. GI consulted, patient to have flex sigmoidoscopy as she is refusing colonoscopy. Per the nurse, the patient told the nurse that she sometimes felt like hurting herself.  Patient stated that she doesn't have a plan now but she is "developing a plan." When asked later, pt says it was a joke. Psych consulted and says pt is in no danger to herself or others. Results of sigmoidoscopy shows finding suggestive of IBD. Hydrocortisone enema ordered however patient has refused all steroid therapy as she think this is all diet related and does not want medications. Patient is cleared for discharge with follow up to GI and IBD clinic. Patient refuses discharge because she does not feel back to baseline; patient however refusing steroid treatments. She has been cleared for discharge to home by PT and set up with home PT. Patient given AMA form and will be discharged.

## 2019-04-09 NOTE — H&P ADULT - HISTORY OF PRESENT ILLNESS
This is a 83 year old female with pmhx of iron deficiency anemia, prior GIB, LE edema, dementia who presents with BRBPR.     IBS, GI bleed, IDAnemia, asthma, LE edema, memory impairment  	-sent last week to  for abd distension/pain 3/29 - heparinized for edematous legs while admitted for DVT prophylaxis   	last CBC 11/2018 hgb 9    	Pt sent to Ed for GI bleeding. Discussed with PCP this AM that she was feeling generalized weakness and that she had been noting BRB in toilet bowl with red clots. Pt known to be anemic. Of note was at  for 3 days for admission for Abd pain and distension on 3/29. During that time was heparinized for DVT prophylaxis. Pt states bleeding began the day after she was discharged from . Discussed case with Dr. Al - PCP- states pt also needs to be admitted for a full neuro psychology workup for worsening dementia and confusion. Pt has no other medical complaint at this time. Denies abd pain, fevers, chills, n/v/d/c, cp, sob, grissom, palpitations, lightheadedness This is a 83 year old female with pmhx of iron deficiency anemia, hemorrhoids, osteoporsis, R hip replacement LE edema, dementia who presents with BRBPR.     IBS, GI bleed, IDAnemia, asthma, LE edema, memory impairment  	-sent last week to  for abd distension/pain 3/29 - heparinized for edematous legs while admitted for DVT prophylaxis   	last CBC 11/2018 hgb 9    	Pt sent to Ed for GI bleeding. Discussed with PCP this AM that she was feeling generalized weakness and that she had been noting BRB in toilet bowl with red clots. Pt known to be anemic. Of note was at  for 3 days for admission for Abd pain and distension on 3/29. During that time was heparinized for DVT prophylaxis. Pt states bleeding began the day after she was discharged from . Discussed case with Dr. Al - PCP- states pt also needs to be admitted for a full neuro psychology workup for worsening dementia and confusion. Pt has no other medical complaint at this time. Denies abd pain, fevers, chills, n/v/d/c, cp, sob, grissom, palpitations, lightheadedness This is a 83 year old female with pmhx of iron deficiency anemia, hemorrhoids, osteoporsis, R hip replacement LE edema, dementia who presents with BRBPR. The patient is a poor historian and was The patient states that about in March, she started to have abdominal pain and distention prompting her to go to the the ED at [facility]. S    IBS, GI bleed, IDAnemia, asthma, LE edema, memory impairment  	-sent last week to  for abd distension/pain 3/29 - heparinized for edematous legs while admitted for DVT prophylaxis   	last CBC 11/2018 hgb 9    	Pt sent to Ed for GI bleeding. Discussed with PCP this AM that she was feeling generalized weakness and that she had been noting BRB in toilet bowl with red clots. Pt known to be anemic. Of note was at  for 3 days for admission for Abd pain and distension on 3/29. During that time was heparinized for DVT prophylaxis. Pt states bleeding began the day after she was discharged from . Discussed case with Dr. Al - PCP- states pt also needs to be admitted for a full neuro psychology workup for worsening dementia and confusion. Pt has no other medical complaint at this time. Denies abd pain, fevers, chills, n/v/d/c, cp, sob, grissom, palpitations, lightheadedness This is a 83 year old female with pmhx of iron deficiency anemia, hemorrhoids, osteoporsis, R hip replacement LE edema, dementia who presents with BRBPR. The patient is a poor historian and was difficult to assess history and why she was in the hospital. The patient states that about in March, she started to have abdominal pain and distention prompting her to go to the ED at [facility]. S    IBS, GI bleed, IDAnemia, asthma, LE edema, memory impairment  	-sent last week to  for abd distension/pain 3/29 - heparinized for edematous legs while admitted for DVT prophylaxis   	last CBC 11/2018 hgb 9    	Pt sent to Ed for GI bleeding. Discussed with PCP this AM that she was feeling generalized weakness and that she had been noting BRB in toilet bowl with red clots. Pt known to be anemic. Of note was at  for 3 days for admission for Abd pain and distension on 3/29. During that time was heparinized for DVT prophylaxis. Pt states bleeding began the day after she was discharged from . Discussed case with Dr. Al - PCP- states pt also needs to be admitted for a full neuro psychology workup for worsening dementia and confusion. Pt has no other medical complaint at this time. Denies abd pain, fevers, chills, n/v/d/c, cp, sob, grissom, palpitations, lightheadedness This is a 83 year old female with pmhx of iron deficiency anemia, hemorrhoids, osteoporsis, R hip replacement LE edema, dementia who presents with BRBPR. The patient is a poor historian and was difficult to assess history and why she was in the hospital. The patient states that about in March, she started to have abdominal pain and distention prompting her to go to the ED at [facility]. She     IBS, GI bleed, IDAnemia, asthma, LE edema, memory impairment  	-sent last week to  for abd distension/pain 3/29 - heparinized for edematous legs while admitted for DVT prophylaxis   	last CBC 11/2018 hgb 9    	Pt sent to Ed for GI bleeding. Discussed with PCP this AM that she was feeling generalized weakness and that she had been noting BRB in toilet bowl with red clots. Pt known to be anemic. Of note was at  for 3 days for admission for Abd pain and distension on 3/29. During that time was heparinized for DVT prophylaxis. Pt states bleeding began the day after she was discharged from . Discussed case with Dr. Al - PCP- states pt also needs to be admitted for a full neuro psychology workup for worsening dementia and confusion. Pt has no other medical complaint at this time. Denies abd pain, fevers, chills, n/v/d/c, cp, sob, grissom, palpitations, lightheadedness This is a 83 year old female with pmhx of iron deficiency anemia, hemorrhoids, osteoporsis, R hip replacement LE edema, dementia who presents with BRBPR. The patient is a poor historian and was difficult to assess history and why she was in the hospital. The patient states that about in March, she started to have abdominal pain and distention prompting her to go to the ED at [facility]. She states that nothing happened there and she was discharged home. However, per the records, the patient had LE edema, was given DVT ppx and then discharged home as her workup was negative at that time. She then has had blood in the bowl of her water, not mixed in with stool that she noted at home. She also notes that she has been feeling more weak than usual, but has had a good appetite. Her PMD sent her in for possible GIB and neuropysch workup in setting of DM. The patient currently denies any This is a 83 year old female with pmhx of iron deficiency anemia, hemorrhoids, osteoporsis, R hip replacement LE edema, dementia who presents with BRBPR. The patient is a poor historian and was difficult to assess history and why she was in the hospital. The patient states that about in March, she started to have abdominal pain and distention prompting her to go to the ED at [facility]. She states that nothing happened there and she was discharged home. However, per the records, the patient had LE edema, was given DVT ppx and then discharged home as her workup was negative at that time. She then has had blood in the bowl of her water, not mixed in with stool that she noted at home. She also notes that she has been feeling more weak than usual, but has had a good appetite. Her PMD sent her in for possible GIB and neuropysch workup in setting of DM. The patient currently denies any symptoms besides feeling tired, LE edema that has improved and not having seen a doctor in person to talk to. Denies fever, chills, SOB, CP, abdominal pain, v/n/d/c, urinary changes, numbness or tingling in his extremities.    Of note, the patient has seen GI and cardiology in the past. Has had negative workup, with exam showing anal fissure and hemorrhoids. Declined colonoscopy on prior admission to OSH, and states that she was okay. She had a hip replacement after a fall at home, no complications from procedure. She notes that she needs aid at home as she cannot perform her ADLs    In the ED: Vitals: T 98.2, HR 74, /68, RR 16, 94%SpO2. Labs sig for WBC 11.1, Hgb 8.3, albumin 1.9, FOBT positive. Pt received 1L NS started on PPI drip and sent to [facility].      At [facility]: T 98.1, HR 75, /73, RR 18, SpO2 97%.

## 2019-04-09 NOTE — DIETITIAN INITIAL EVALUATION ADULT. - ENERGY NEEDS
ABW used for calculations as pt between % of IBW.   ABW 45kg, IBW 50kg, 90% IBW, ht 62", BMI 18.2  Nutrient needs based on [facility] standards of care for repletion in older adults, adjusted for pressure ulcer + suspected malnutrition, fluid per team

## 2019-04-09 NOTE — PROGRESS NOTE ADULT - PROBLEM SELECTOR PLAN 2
f/u Psych recs; Pt. w/ likely early dementia as well; provide frequent re-orientation, assist w/ ADLs; f/u TSH, B12, folate

## 2019-04-09 NOTE — DIETITIAN INITIAL EVALUATION ADULT. - NS AS NUTRI INTERV ED CONTENT
discussed eating balanced meals through day + importance of adequate intake/Purpose of the nutrition education

## 2019-04-09 NOTE — H&P ADULT - PROBLEM SELECTOR PLAN 1
The patient is presenting with BRBPR for the past few days at home  She denies any SOB, dizziness, lightheadedness  Has hx of DONAVAN, prior hgb was 9   - moniter CBC at this time, no lonnie bleeding currently and HDS and likely is from hemorrhoids however will need to rule out LGIB    - active type and screen   - GI consult in AM   - monitor vitals per RMF

## 2019-04-09 NOTE — PROGRESS NOTE ADULT - SUBJECTIVE AND OBJECTIVE BOX
Patient is a 83y old  Female who presents with a chief complaint of BRBPR (2019 15:37)      INTERVAL HPI/OVERNIGHT EVENTS:    Pt. seen and examined at 10:30AM w/ housestaff on rounds  Pt. c/o BRPBR; denies CP, SOB, lightheadedness   Events noted; Pt. reported SI O/N in setting of frustration re: NPO status; Pt. no longer reports SI    Review of Systems: 12 point review of systems otherwise negative    MEDICATIONS  (STANDING):  sodium chloride 0.9%. 1000 milliLiter(s) (40 mL/Hr) IV Continuous <Continuous>    MEDICATIONS  (PRN):      Allergies    Cipro (Anaphylaxis)  Gluten (Anaphylaxis)  lactose (Anaphylaxis)  Nuts (Anaphylaxis)    Intolerances          Vital Signs Last 24 Hrs  T(C): 36.7 (2019 15:50), Max: 37.1 (2019 18:35)  T(F): 98.1 (2019 15:50), Max: 98.8 (2019 18:35)  HR: 82 (2019 15:50) (66 - 89)  BP: 110/68 (2019 15:50) (110/58 - 122/71)  BP(mean): 82 (2019 15:50) (82 - 82)  RR: 17 (2019 15:50) (17 - 18)  SpO2: 95% (2019 15:50) (94% - 98%)  CAPILLARY BLOOD GLUCOSE            Physical Exam:  (at 10:30AM)  Daily Height in cm: 157.48 (2019 08:59)    Daily Weight in k (2019 15:31)  General:  comfortable-appearing in NAD eating a sandwich; underweight  HEENT:  anicteric, no pallor  CV:  no JVD  Rectal: FOBT + but no gross blood per housestaff exam  Extremities:  nails unremarkable; B/L LE edema (chronic, per Pt.)  Skin:  WWP  Neuro:  AAOx3, forgetful     LABS:                        8.1    13.11 )-----------( 306      ( 2019 15:17 )             28.2     04-09    138  |  104  |  10  ----------------------------<  87  3.8   |  26  |  0.51    Ca    8.1<L>      2019 07:32  Mg     2.2         TPro  6.1  /  Alb  2.3<L>  /  TBili  0.3  /  DBili  x   /  AST  11  /  ALT  9<L>  /  AlkPhos  98      PT/INR - ( 2019 12:57 )   PT: 11.3 sec;   INR: 1.02          PTT - ( 2019 12:57 )  PTT:30.8 sec  Urinalysis Basic - ( 2019 15:03 )    Color: Yellow / Appearance: Clear / S.015 / pH: x  Gluc: x / Ketone: NEGATIVE  / Bili: NEGATIVE / Urobili: 0.2 E.U./dL   Blood: x / Protein: NEGATIVE mg/dL / Nitrite: NEGATIVE   Leuk Esterase: NEGATIVE / RBC: < 5 /HPF / WBC < 5 /HPF   Sq Epi: x / Non Sq Epi: 0-5 /HPF / Bacteria: Few /HPF          RADIOLOGY & ADDITIONAL TESTS:    ---------------------------------------------------------------------------  I personally reviewed: [  ]EKG   [  ]CXR    [  ] CT    [  ]Other  ---------------------------------------------------------------------------  PLEASE CHECK WHEN PRESENT:     [  ]Heart Failure     [  ] Acute     [  ] Acute on Chronic     [  ] Chronic  -------------------------------------------------------------------     [  ]Diastolic [HFpEF]     [  ]Systolic [HFrEF]     [  ]Combined [HFpEF & HFrEF]     [  ]Other:  -------------------------------------------------------------------  [  ]ZULEMA     [  ]ATN     [  ]Reneal Medullary Necrosis     [  ]Renal Cortical Necrosis     [  ]Other Pathological Lesions:    [  ]CKD 1  [  ]CKD 2  [  ]CKD 3  [  ]CKD 4  [  ]CKD 5  [  ]Other  -------------------------------------------------------------------  [  ]Other/Unspecified:    --------------------------------------------------------------------    Abdominal Nutritional Status  [  ]Malnutrition: See Nutrition Note  [  ]Cachexia  [  ]Other:   [  ]Supplement Ordered:  [  ]Morbid Obesity (BMI >=40]

## 2019-04-09 NOTE — H&P ADULT - PROBLEM SELECTOR PLAN 2
Pt with LE edema, no hx of CHF in the past  No medications that cause LE edema  Pt states she had SCDs and hep sub q on prior hospitalization and swelling improved  She states that her swelling has improved from prior, however is mostly in bed   - SCDs  - LE doppler t r/u DVT as pt stationary Pt with LE edema, no hx of CHF in the past  No medications that cause LE edema  Had ECHO in Feb with cardiologist, when she had LE swelling present at that time and was WNL, however do not have report, will need collateral  Pt states she had SCDs and hep sub q on prior hospitalization and swelling improved  She states that her swelling has improved from prior, however is mostly in bed   - SCDs  - LE doppler t r/u DVT as pt stationary

## 2019-04-09 NOTE — PHYSICAL THERAPY INITIAL EVALUATION ADULT - PERTINENT HX OF CURRENT PROBLEM, REHAB EVAL
Pt. is an 83 y.o. female presenting with gen weakness and an episode of BRBPR, who is currently admitted for further GI work up.

## 2019-04-09 NOTE — DIETITIAN INITIAL EVALUATION ADULT. - PROBLEM SELECTOR PLAN 3
Pt extremely thin, however endorses good po intake   Nutrition and dietician consult in AM     #hypoalbuminemia   likely in setting of poor po intake   Will continue to trend   Plan as above    #leukocytosis likely 2/2 dehydration as patient with no signs of infection. Pt with no fever, HDS, no cough, or urinary sx. Likely dehydration as sx improved with fluids. Will monitor CBC at this time

## 2019-04-09 NOTE — DIETITIAN INITIAL EVALUATION ADULT. - OTHER INFO
83F with pmhx of iron deficiency anemia, hemorrhoids, osteoporosis, R hip replacement LE edema, dementia who presents with BRBPR. The patient is a poor historian. After pt with fall + hip replacement has not been able to perform ADLs, aide at bedside. Pt observed eating breakfast, states she brings in her own food as she has IBS, avoids gluten, lactose, nuts as upsets stomach, tries to follow low FODMAP diet. ~50% breakfast eaten, suspect malnutrition, see chart note. Due to dieting in past to aide her IBS reports she has lost 18kg, 30% loss x2 years ago, however regained some weight and is usually stable around 52kg thus indicating -7kg wt loss, however unsure when she was last 52kg. Reports recent admits to other facilities where wt continues to fluctuate 2-5kg thus she is unsure of her current wt. On admit wt 41kg, now recorded at 45kg, states fluid in legs causing wt gain and loss. No noted n/v/d/c, chewing/ swallowing issues or pain impacting intake, skin with stage 1 pressure ulcer. Encouraged intake through day, discussed supplement to further meet needs however declined as she is very specific about her diet. Will continue to follow per protocol.

## 2019-04-09 NOTE — CONSULT NOTE ADULT - ASSESSMENT
83 year old female with pmhx of iron deficiency anemia, hemorrhoids, osteoporsis, R hip replacement LE edema, dementia who presents with BRBPR  Hematochezia  -hemoglobin 7.8, down from baseline of 9 in Feb, VSS  -hemorrhoids vs anal fissure high on ddx given her reported history, but ulcer vs avm vs diverticulosis vs colitis vs mass also on ddx  -recommend colonoscopy but pt is not agreeable. she will think about flex sig  -c/w supportive care. Monitor hemoglobin and transfuse if <7 83 year old female with pmhx of iron deficiency anemia, hemorrhoids, osteoporsis, R hip replacement LE edema, dementia who presents with BRBPR  Hematochezia  -hemoglobin 7.8, down from baseline of 9 in Feb, VSS  -hemorrhoids vs anal fissure high on ddx given her reported history, but ulcer vs avm vs diverticulosis vs colitis vs mass also on ddx  -recommend colonoscopy but pt is not agreeable. she will think about flex sig  -c/w supportive care. Monitor hemoglobin and transfuse if <7  -iron studies 83 year old female with pmhx of iron deficiency anemia, hemorrhoids, osteoporsis, R hip replacement LE edema, dementia who presents with BRBPR  Hematochezia  -hemoglobin 7.8, down from baseline of 9 in Feb, VSS  -hemorrhoids vs anal fissure high on ddx given her reported history, but ulcer vs avm vs diverticulosis vs colitis vs mass also on ddx  -recommend colonoscopy but pt is not agreeable. she is agreeable to flex sig- please make NPO at midnight, 2 Tap water enemas at 5 am tomorrow  -c/w supportive care. Monitor hemoglobin and transfuse if <7  -iron studies

## 2019-04-09 NOTE — H&P ADULT - NSHPPHYSICALEXAM_GEN_ALL_CORE
.  VITAL SIGNS:  T(F): 98.1 (04-08-19 @ 23:56), Max: 98.8 (04-08-19 @ 18:35)  HR: 75 (04-08-19 @ 23:56) (73 - 80)  BP: 114/73 (04-08-19 @ 23:56) (110/58 - 118/65)  BP(mean): --  RR: 18 (04-08-19 @ 23:56) (16 - 18)  SpO2: 97% (04-08-19 @ 23:56) (94% - 98%)    PHYSICAL EXAM:    Constitutional: cachectic, elderly lady   HEENT: NC/AT, PERRL, EOMI, anicteric sclera, no nasal discharge; uvula midline, no oropharyngeal erythema or exudates; dMM  Neck: supple; no JVD or thyromegaly  Respiratory: CTA B/L; no W/R/R, no retractions  Cardiac: +S1/S2; RRR; no M/R/G; PMI non-displaced  Gastrointestinal: soft, NT/ND; no rebound or guarding; +BSx4  Rectal: empty rectal vault, no blood seen, no external hemorrhoids appreciated, stage 2 sacral ulcer   Back: spine midline, no bony tenderness or step-offs; no CVAT B/L  Extremities: WWP, no clubbing or cyanosis; no peripheral edema  Musculoskeletal: NROM x4; no joint swelling, tenderness or erythema; very thin upper extremities   Vascular: 2+ radial, DP/PT pulses B/L  Dermatologic: skin warm, dry and intact; no rashes, wounds, or scars  Neurologic: AAOx3; CNII-XII grossly intact; no focal deficits  Psychiatric: affect and characteristics of appearance, verbalizations, behaviors are appropriate, denies SI/HI/AH/VH .  VITAL SIGNS:  T(F): 98.1 (04-08-19 @ 23:56), Max: 98.8 (04-08-19 @ 18:35)  HR: 75 (04-08-19 @ 23:56) (73 - 80)  BP: 114/73 (04-08-19 @ 23:56) (110/58 - 118/65)  BP(mean): --  RR: 18 (04-08-19 @ 23:56) (16 - 18)  SpO2: 97% (04-08-19 @ 23:56) (94% - 98%)    PHYSICAL EXAM:    Constitutional: cachectic, elderly lady   HEENT: NC/AT, PERRL, EOMI, anicteric sclera, no nasal discharge; uvula midline, no oropharyngeal erythema or exudates; dMM  Neck: supple; no JVD or thyromegaly  Respiratory: CTA B/L; no W/R/R, no retractions  Cardiac: +S1/S2; RRR; no M/R/G; PMI non-displaced  Gastrointestinal: soft, NT/ND; no rebound or guarding; +BSx4  Rectal: empty rectal vault, no blood seen, no external hemorrhoids appreciated, stage 2 sacral ulcer   Back: spine midline, no bony tenderness or step-offs; no CVAT B/L  Extremities: WWP, no clubbing or cyanosis; no peripheral edema  Musculoskeletal: NROM x4; no joint swelling, tenderness or erythema; very thin upper extremities   Vascular: 2+ radial, DP/PT pulses B/L  Neurologic: AAOx3; CNII-XII grossly intact; no focal deficits; 0/3 word recall, tangential speech, easily distractable, finger to nose intact, rapid alternating intact   Psychiatric: affect and characteristics of appearance, verbalizations, behaviors are appropriate, denies SI/HI/AH/VH

## 2019-04-09 NOTE — PATIENT PROFILE ADULT - NSPROMEDSDISPOSITION_GEN_A_NUR
bedside bedside/Refusing to give staff meds. Instructed not to take meds. Pt stated that she wants to take her own meds. Dr Best aware. Primary nurse aware.

## 2019-04-09 NOTE — BEHAVIORAL HEALTH ASSESSMENT NOTE - NSBHCONSULTFOLLOWAFTERCARE_PSY_A_CORE FT
1)Pt can follow up with her therapist.    2)Pt can be discharged home from psychiatric point of view as she is not a danger to self or others.    3)No medication necessary.

## 2019-04-09 NOTE — PROGRESS NOTE ADULT - PROBLEM SELECTOR PLAN 3
likely d/t hypoalbuminemia, d/t malnutrition; elevate legs; checking B/L LE Dopplers; need collateral info re: recent outpatient TTE

## 2019-04-09 NOTE — PROGRESS NOTE ADULT - SUBJECTIVE AND OBJECTIVE BOX
Patient is a 83y old  Female who presents with a chief complaint of BRBPR (2019 07:46)      INTERVAL HPI/OVERNIGHT EVENTS:  ICU Vital Signs Last 24 Hrs  T(C): 36.8 (2019 05:53), Max: 37.1 (2019 18:35)  T(F): 98.2 (2019 05:53), Max: 98.8 (2019 18:35)  HR: 66 (2019 05:53) (66 - 80)  BP: 122/71 (2019 05:53) (110/58 - 122/71)  BP(mean): --  ABP: --  ABP(mean): --  RR: 18 (2019 05:53) (16 - 18)  SpO2: 94% (2019 05:53) (94% - 98%)    I&O's Summary        LABS:                        8.3    9.91  )-----------( 290      ( 2019 07:32 )             28.3     04-    138  |  104  |  10  ----------------------------<  87  3.8   |  26  |  0.51    Ca    8.1<L>      2019 07:32  Mg     2.2         TPro  6.1  /  Alb  2.3<L>  /  TBili  0.3  /  DBili  x   /  AST  11  /  ALT  9<L>  /  AlkPhos  98  04-09    PT/INR - ( 2019 12:57 )   PT: 11.3 sec;   INR: 1.02          PTT - ( 2019 12:57 )  PTT:30.8 sec  Urinalysis Basic - ( 2019 15:03 )    Color: Yellow / Appearance: Clear / S.015 / pH: x  Gluc: x / Ketone: NEGATIVE  / Bili: NEGATIVE / Urobili: 0.2 E.U./dL   Blood: x / Protein: NEGATIVE mg/dL / Nitrite: NEGATIVE   Leuk Esterase: NEGATIVE / RBC: < 5 /HPF / WBC < 5 /HPF   Sq Epi: x / Non Sq Epi: 0-5 /HPF / Bacteria: Few /HPF      CAPILLARY BLOOD GLUCOSE            RADIOLOGY & ADDITIONAL TESTS:    Consultant(s) Notes Reviewed:  [x ] YES  [ ] NO    MEDICATIONS  (STANDING):  pantoprazole  Injectable 40 milliGRAM(s) IV Push every 12 hours  sodium chloride 0.9%. 1000 milliLiter(s) (40 mL/Hr) IV Continuous <Continuous>    MEDICATIONS  (PRN):      PHYSICAL EXAM:  GENERAL: well built, well nourished  HEAD:  Atraumatic, Normocephalic  EYES: EOMI, PERRLA, conjunctiva and sclera clear  ENT: No tonsillar erythema, exudates, or enlargement; Moist mucous membranes, Good dentition, No lesions  NECK: Supple, No JVD, Normal thyroid, no enlarged nodes  NERVOUS SYSTEM:  Alert & Oriented X3, Good concentration; Motor Strength 5/5 B/L upper and lower extremities; DTRs 2+ intact and symmetric, sensory intact  CHEST/LUNG: B/L good air entry; No rales, rhonchi, or wheezing  HEART: S1S2 normal, no S3, Regular rate and rhythm; No murmurs, rubs, or gallops  ABDOMEN: Soft, Nontender, Nondistended; Bowel sounds present  EXTREMITIES:  2+ Peripheral Pulses, No clubbing, cyanosis, or edema  LYMPH: No lymphadenopathy noted  SKIN: No rashes or lesions    Care Discussed with Consultants/Other Providers [ x] YES  [ ] NO OVERNIGHT EVENTS: TOYA    INTERVAL HPI: Pt is examined at bedside. She appears well in NAD. Patient ays she had 1-2 episodes of blood in stool however not seen by the nurse. Patient says she feels tired and hungry, she explains she has had multiple colonoscopies in the past which were normal and does not want another one. She denies any CP, SOB, abdominal pain, nausea/vomiting, fevers/chills. Patient said she made a joke earlier and does not have any suicidal ideations.     ICU Vital Signs Last 24 Hrs  T(C): 36.8 (2019 05:53), Max: 37.1 (2019 18:35)  T(F): 98.2 (2019 05:53), Max: 98.8 (2019 18:35)  HR: 66 (2019 05:53) (66 - 80)  BP: 122/71 (2019 05:53) (110/58 - 122/71)  BP(mean): --  ABP: --  ABP(mean): --  RR: 18 (2019 05:53) (16 - 18)  SpO2: 94% (2019 05:53) (94% - 98%)    I&O's Summary        LABS:                        8.3    9.91  )-----------( 290      ( 2019 07:32 )             28.3     04-    138  |  104  |  10  ----------------------------<  87  3.8   |  26  |  0.51    Ca    8.1<L>      2019 07:32  Mg     2.2         TPro  6.1  /  Alb  2.3<L>  /  TBili  0.3  /  DBili  x   /  AST  11  /  ALT  9<L>  /  AlkPhos  98  04-09    PT/INR - ( 2019 12:57 )   PT: 11.3 sec;   INR: 1.02          PTT - ( 2019 12:57 )  PTT:30.8 sec  Urinalysis Basic - ( 2019 15:03 )    Color: Yellow / Appearance: Clear / S.015 / pH: x  Gluc: x / Ketone: NEGATIVE  / Bili: NEGATIVE / Urobili: 0.2 E.U./dL   Blood: x / Protein: NEGATIVE mg/dL / Nitrite: NEGATIVE   Leuk Esterase: NEGATIVE / RBC: < 5 /HPF / WBC < 5 /HPF   Sq Epi: x / Non Sq Epi: 0-5 /HPF / Bacteria: Few /HPF      CAPILLARY BLOOD GLUCOSE            RADIOLOGY & ADDITIONAL TESTS:    Consultant(s) Notes Reviewed:  [x ] YES  [ ] NO    MEDICATIONS  (STANDING):  pantoprazole  Injectable 40 milliGRAM(s) IV Push every 12 hours  sodium chloride 0.9%. 1000 milliLiter(s) (40 mL/Hr) IV Continuous <Continuous>    MEDICATIONS  (PRN):      PHYSICAL EXAM:  GENERAL: Elderly female in NAD.   HEAD:  Atraumatic, Normocephalic  EYES: EOMI, PERRLA, conjunctiva and sclera clear  NECK: Supple, No JVD, Normal thyroid, no enlarged nodes  NERVOUS SYSTEM:  Alert & Oriented X3,  Motor Strength 5/5 B/L upper and lower extremities; DTRs 2+ intact and symmetric, sensory intact  CHEST/LUNG: B/L good air entry; No rales, rhonchi, or wheezing  HEART: S1S2 normal, no S3, Regular rate and rhythm; No murmurs, rubs, or gallops  ABDOMEN: Soft, Nontender, Nondistended; Bowel sounds present  EXTREMITIES:  2+ pitting edema   LYMPH: No lymphadenopathy noted  SKIN: No rashes or lesions    Care Discussed with Consultants/Other Providers [ x] YES  [ ] NO

## 2019-04-09 NOTE — BEHAVIORAL HEALTH ASSESSMENT NOTE - NSBHADMITCOUNSEL_PSY_A_CORE
risks and benefits of treatment options/instructions for management, treatment and follow up/risk factor reduction/client/family/caregiver education/diagnostic results/impressions and/or recommended studies/prognosis/importance of adherence to chosen treatment

## 2019-04-09 NOTE — CHART NOTE - NSCHARTNOTEFT_GEN_A_CORE
Was called to pt bedside around 2 am   Per the nurse, the patient told the nurse that she sometimes felt like hurting herself.  Patient stated that she doesn't have a plan now but she is "developing a plan."  Went to evaluate the patient, she states she does not know why she said that and does not want to hurt herself. She just felt like her mind was active overnight and she wants to get better. She denies SI and having a plan at this time  Pt refuses to give her medications however they are put away across the room as well.   Will continue to monitor, no acute changes made to management at this time

## 2019-04-09 NOTE — BEHAVIORAL HEALTH ASSESSMENT NOTE - HPI (INCLUDE ILLNESS QUALITY, SEVERITY, DURATION, TIMING, CONTEXT, MODIFYING FACTORS, ASSOCIATED SIGNS AND SYMPTOMS)
83yF with PMHx of iron deficiency anemia, hemorrhoids, osteoporosis, R hip replacement, LE edema, dementia who presented with BRBPR after being sent in by her PMD. Patient was admitted overnight for further workup and was kept NPO initially. After being taken to her room, the nurse reports that she stated that she felt like hurting herself and that she was "developing a plan". This morning, the patient reports that she has no intentions or plans of hurting herself and never has. She states she said that be cause she was hungry and frustrated with not being able to get any sleep because they were moving her to her room and setting her IV and bed up. The patient states that she just wants the cheerio's and rice cakes that she brought with her to the hospital, but they haven't given them to her yet. She said she is optimistic for getting better and listens to spiritual music on her phone regularly. She reports she enjoys joking around and it was more of a joke. Per her home health aide who is at bedside, the patient sometimes says that she wants to hurt herself when she is hungry so that she gets food faster and that she did the same thing while admitted at another hospital recently.

## 2019-04-09 NOTE — H&P ADULT - PROBLEM SELECTOR PLAN 3
Pt extremely thin, however endorses good po intake   Nutrition and dietician consult in AM     #hypoalbuminemia   likely in setting of poor po intake   Will continue to trend   Plan as above Pt extremely thin, however endorses good po intake   Nutrition and dietician consult in AM     #hypoalbuminemia   likely in setting of poor po intake   Will continue to trend   Plan as above    #leukocytosis likely 2/2 dehydration as patient with no signs of infection. Pt with no fever, HDS, no cough, or urinary sx. Likely dehydration as sx improved with fluids. Will monitor CBC at this time

## 2019-04-09 NOTE — CHART NOTE - NSCHARTNOTEFT_GEN_A_CORE
Upon Nutritional Assessment by the Registered Dietitian your patient was determined to meet criteria / has evidence of the following diagnosis/diagnoses:          [ ]  Mild Protein Calorie Malnutrition        [ ]  Moderate Protein Calorie Malnutrition        [ x] Severe Protein Calorie Malnutrition        [ ] Unspecified Protein Calorie Malnutrition        [x ] Underweight / BMI <19        [ ] Morbid Obesity / BMI > 40    Per physical assessment: Muscle Wasting- Temporal [   ]  Clavicle/Pectoral [   ]  Shoulder/Deltoid [  x ]  Scapula [   ]  Interosseous [  x ]  Quadriceps [   ]  Gastrocnemius [   ]; Fat Wasting- Orbital [   ]  Buccal [   ]  Triceps [   ]  Rib [   ]--> Suspect severe PCM 2/2 to physical assessment, <75% EER being met > 1month, and 7kg, 14% wt loss over suspected recent period of time, pt unable to recall ; please see malnutrition chart note.     Findings as based on:  •  Comprehensive nutrition assessment and consultation    Treatment:    The following diet has been recommended:    Continue regular diet, GF, lactose free + MVI     PROVIDER Section:     By signing this assessment you are acknowledging and agree with the diagnosis/diagnoses assigned by the Registered Dietitian    Comments:

## 2019-04-09 NOTE — PROGRESS NOTE ADULT - PROBLEM SELECTOR PLAN 5
F: NS 40cc/hr  E: Replete prn   N: NPO for possible GI procedure Patient with history of depression like symptoms and questionable suicidal ideations. Patient notified nurse overnigtht of thoughts of hurting herself; however when asks, says she was joking. Patient denies any plan  - psych consult  - consider SSRI

## 2019-04-09 NOTE — PROGRESS NOTE ADULT - PROBLEM SELECTOR PLAN 2
Pt with LE edema, no hx of CHF in the past. No medications that cause LE edema  Had ECHO in Feb with cardiologist, when she had LE swelling present at that time and was WNL, however do not have report, will need collateral.   Pt states she had SCDs and hep sub q on prior hospitalization and swelling improved. She states that her swelling has improved from prior, however is mostly in bed   - c/w SCDs  - LE doppler t r/u DVT as pt stationary  - f/up echocardiogram. Patient with hx of DONAVAN with baseline hgb 9 per outpatient records. Hgb of 8.3 on admission. FOBT+ however pt is on iron supplementation and rectal exam neg for blood. Likely poor nutritional status.   - trend CBC q12hrs   - Active T&S  - Transfusion goal >7  - nutrition consult  - plan above

## 2019-04-09 NOTE — DIETITIAN INITIAL EVALUATION ADULT. - FACTORS AFF FOOD INTAKE
IBS, disordered eating/pain/difficulty with food procurement/preparation/persistent lack of appetite/other (specify)

## 2019-04-09 NOTE — CONSULT NOTE ADULT - ASSESSMENT
This is a 83 year old female with pmhx of iron deficiency anemia, hemorrhoids, osteoporosis R hip replacement LE edema, dementia who presents with BRBPR. Admitted for BRBPR    Problem/Plan - 1:  ·  Problem: BRBPR (bright red blood per rectum).  Plan: The patient is presenting with BRBPR for the past few days at home. She denies any SOB, dizziness, lightheadedness. Pt has hx of DONAVAN, prior hgb was 9. Rectal exam normal, no hemroids or bleeding. FOBT postitive.   - monitor CBC at this time, no lonnie bleeding currently and HDS   - active type and screen   - GI consult, f/uop recs   - Plan for flex sig tomm.   - npo after midnight   - c/w pantoprazole 40mg IVP BID  - monitor vitals per RMF.     Problem/Plan - 2:  ·  Problem: Anemia.  Plan: Patient with hx of DONAVAN with baseline hgb 9 per outpatient records. Hgb of 8.3 on admission. FOBT+ however pt is on iron supplementation and rectal exam neg for blood. Likely poor nutritional status.   - trend CBC q12hrs   - Active T&S  - Transfusion goal >7  - nutrition consult  - plan above.     Problem/Plan - 3:  ·  Problem: Lower extremity edema.  Plan: Pt with LE edema, no hx of CHF in the past. No medications that cause LE edema  Had ECHO in Feb with cardiologist, when she had LE swelling present at that time and was WNL, however do not have report, will need collateral.   - Pt with echo in 2/2019 with EF of 60-65% and Grade I Diastolic dysfunction.   Pt states she had SCDs and hep sub q on prior hospitalization and swelling improved. She states that her swelling has improved from prior, however is mostly in bed   - c/w SCDs  - LE doppler t r/u DVT as pt stationary.    Problem/Plan - 4:  ·  Problem: Failure to thrive in adult.  Plan: Pt extremely thin, however endorses good po intake. Patient says she has a particular diet and eats every 2 hours. albumin 2.9   - Nutrition and dietician consult    #hypoalbuminemia   likely in setting of poor po intake   Will continue to trend   Plan as above    #leukocytosis likely 2/2 dehydration as patient with no signs of infection. Pt with no fever, HDS, no cough, or urinary sx. Likely dehydration as sx improved with fluids. - trend CBC  - NOW RESOLVED.    Problem/Plan - 5:  ·  Problem: Depression.  Plan: Patient with history of depression like symptoms and questionable suicidal ideations. Patient notified nurse overnigtht of thoughts of hurting herself; however when asks, says she was joking. Patient denies any plan  - psych consult  - consider SSRI.     Problem/Plan - 6:  Problem: Dementia. Plan: Pt with dementia, 0/3 word recall, tangential thought and cannot remember what happened when. More likely related to old age however will rule out other causes.   - f/u b12, folate, tsh.    Problem/Plan - 7:  ·  Problem: Nutrition, metabolism, and development symptoms.  Plan: F: NS 40cc/hr  E: Replete prn   N: NPO for possible GI procedure.

## 2019-04-09 NOTE — H&P ADULT - PROBLEM SELECTOR PLAN 4
Pt with dementia, 0/3 word recall, tangential thought and cannot remember what happened when. More likely related to old age however will rule outother causes at this time   - f/u b12, folate, tsh

## 2019-04-09 NOTE — H&P ADULT - NSHPSOCIALHISTORY_GEN_ALL_CORE
no family hx of IBD, colon ca, HTN    No etoh, tobacco use, illicit drug use  Lives at home alone, has difficulty performing ADLs

## 2019-04-09 NOTE — H&P ADULT - ASSESSMENT
This is a 83 year old female with pmhx of iron deficiency anemia, hemorrhoids, osteoporosis R hip replacement LE edema, dementia who presents with BRBPR. Admitted for BRBPR

## 2019-04-09 NOTE — PROGRESS NOTE ADULT - PROBLEM SELECTOR PLAN 4
Pt with dementia, 0/3 word recall, tangential thought and cannot remember what happened when. More likely related to old age however will rule out other causes.   - f/u b12, folate, tsh Pt extremely thin, however endorses good po intake. Patient says she has a particular diet and eats every 2 hours. albumin 2.9   - Nutrition and dietician consult    #hypoalbuminemia   likely in setting of poor po intake   Will continue to trend   Plan as above    #leukocytosis likely 2/2 dehydration as patient with no signs of infection. Pt with no fever, HDS, no cough, or urinary sx. Likely dehydration as sx improved with fluids. - trend CBC Pt extremely thin, however endorses good po intake. Patient says she has a particular diet and eats every 2 hours. albumin 2.9   - Nutrition and dietician consult    #hypoalbuminemia   likely in setting of poor po intake   Will continue to trend   Plan as above    #leukocytosis likely 2/2 dehydration as patient with no signs of infection. Pt with no fever, HDS, no cough, or urinary sx. Likely dehydration as sx improved with fluids. - trend CBC  - NOW RESOLVED

## 2019-04-09 NOTE — BEHAVIORAL HEALTH ASSESSMENT NOTE - SUMMARY
Patient experiencing agitation from not being able to eat while NPO. Denies any suicidal thoughts currently or in the past and denies any plans of suicide. She expresses optimism for recovery and is telling jokes and listening to spiritual music. She reports she is looking forward to getting out of the hospital and being back home Patient experiencing distress from not being able to eat while NPO. Denies any suicidal thoughts currently or in the past and denies any plans of suicide. She expresses optimism for recovery and is telling jokes and listening to spiritual music. She reports she is looking forward to getting out of the hospital and being back home. Does report some depressed mood at times for which she likes to see a therapist at [facility]    1)Pt can follow up with her therapist.    2)Pt can be discharged home from psychiatric point of view as she is not a danger to self or others.    3)No medication necessary.

## 2019-04-09 NOTE — PHYSICAL THERAPY INITIAL EVALUATION ADULT - PREDICTED DURATION OF THERAPY (DAYS/WKS), PT EVAL
Pt. would benefit from further PT follow up to improve transfers, gait, strength, balance, endurance.

## 2019-04-09 NOTE — CONSULT NOTE ADULT - SUBJECTIVE AND OBJECTIVE BOX
HPI:  This is a 83 year old female with pmhx of iron deficiency anemia, hemorrhoids, osteoporsis, R hip replacement LE edema, dementia who presents with BRBPR. The patient is a poor historian and was difficult to assess history and why she was in the hospital. Last seen in GI clinic in Feb, where she was complaining if chronic diarrhea, for which she was being treated for presumed SIBO. She also complained of intermittent blood in stool, but refused colonoscopy. Hemoglobin at that time was 9.    Allergies    Cipro (Anaphylaxis)  Gluten (Anaphylaxis)  lactose (Anaphylaxis)  Nuts (Anaphylaxis)    Intolerances      Home Medications:  cholestyramine 4 g/5 g oral powder for reconstitution:  (09 Apr 2019 01:18)  ferrous sulfate 300 mg (60 mg elemental iron) oral tablet:  (09 Apr 2019 01:18)    MEDICATIONS:  MEDICATIONS  (STANDING):  sodium chloride 0.9%. 1000 milliLiter(s) (40 mL/Hr) IV Continuous <Continuous>    MEDICATIONS  (PRN):    PAST MEDICAL & SURGICAL HISTORY:  IBS (irritable bowel syndrome)  History of hip replacement, total, right    FAMILY HISTORY:  No pertinent family history in first degree relatives    SOCIAL HISTORY:  [place]: [ ] Current, [ ] Former, [ ] Never; Pack Years:  Alcohol:  Illicit Drugs:    REVIEW OF SYSTEMS:  CONSTITUTIONAL: No weakness, fevers or chills  HEENT: No visual changes; No vertigo or throat pain   NECK: No pain or stiffness  RESPIRATORY: No cough, wheezing, hemoptysis; No shortness of breath  CARDIOVASCULAR: No chest pain or palpitations  GASTROINTESTINAL: No abdominal or epigastric pain. No nausea, vomiting, or hematemesis; No diarrhea or constipation. No melena or hematochezia.  GENITOURINARY: No dysuria, frequency or hematuria  NEUROLOGICAL: No numbness or weakness  SKIN: No itching, burning, rashes, or lesions   All other 10 review of systems is negative unless indicated above.    Vital Signs Last 24 Hrs  T(C): 36.8 (09 Apr 2019 05:53), Max: 37.1 (08 Apr 2019 18:35)  T(F): 98.2 (09 Apr 2019 05:53), Max: 98.8 (08 Apr 2019 18:35)  HR: 66 (09 Apr 2019 05:53) (66 - 80)  BP: 122/71 (09 Apr 2019 05:53) (110/58 - 122/71)  BP(mean): --  RR: 18 (09 Apr 2019 05:53) (16 - 18)  SpO2: 94% (09 Apr 2019 05:53) (94% - 98%)      PHYSICAL EXAM:    General: Well developed; well nourished; in no acute distress  Eyes: Anicteric sclerae, moist conjunctivae  HENT: Moist mucous membranes  Neck: Trachea midline, supple  Lungs: Normal respiratory effors and no intercostal retractions  Cardiovascular: RRR  Abdomen: Soft, non-tender non-distended; Normal bowel sounds; No rebound or guarding  Extremities: Normal range of motion, No clubbing, cyanosis or edema  Neurological: Alert and oriented x3  Skin: Warm and dry. No obvious rash    LABS:                        7.8    10.96 )-----------( 267      ( 09 Apr 2019 00:21 )             26.2     04-09    139  |  106  |  13  ----------------------------<  98  4.0   |  26  |  0.56    Ca    8.0<L>      09 Apr 2019 00:21    TPro  5.8<L>  /  Alb  2.4<L>  /  TBili  0.3  /  DBili  <0.2  /  AST  12  /  ALT  10  /  AlkPhos  97  04-09        PT/INR - ( 08 Apr 2019 12:57 )   PT: 11.3 sec;   INR: 1.02          PTT - ( 08 Apr 2019 12:57 )  PTT:30.8 sec    RADIOLOGY & ADDITIONAL STUDIES: HPI:  This is a 83 year old female with pmhx of iron deficiency anemia, hemorrhoids, osteoporsis, R hip replacement LE edema, dementia who presents with BRBPR. The patient is a poor historian and was difficult to assess history and why she was in the hospital. History of parasitic infections after traveling in [place]. Last seen in GI clinic in Feb, where she was complaining if chronic diarrhea, for which she was being treated for presumed SIBO. She reports that symptoms greatly improved after treatment and she is no longer having diarrhea. She also complained of intermittent blood in stool, but refused colonoscopy as outpatient. Hemoglobin at that time was 9. She currently reports that she will see small amounts of blood in toilet bowel or on toilet paper after having formed BM, she does not strain. This will occur up to once a week, last happened yesterday. Denies abd pain/nausea/vomiting. No NSAIDS/AC/alcohol use.     Allergies    Cipro (Anaphylaxis)  Gluten (Anaphylaxis)  lactose (Anaphylaxis)  Nuts (Anaphylaxis)    Intolerances      Home Medications:  cholestyramine 4 g/5 g oral powder for reconstitution:  (09 Apr 2019 01:18)  ferrous sulfate 300 mg (60 mg elemental iron) oral tablet:  (09 Apr 2019 01:18)    MEDICATIONS:  MEDICATIONS  (STANDING):  sodium chloride 0.9%. 1000 milliLiter(s) (40 mL/Hr) IV Continuous <Continuous>    MEDICATIONS  (PRN):    PAST MEDICAL & SURGICAL HISTORY:  IBS (irritable bowel syndrome)  History of hip replacement, total, right    FAMILY HISTORY:  No pertinent family history in first degree relatives    SOCIAL HISTORY:  [place]:denies  Alcohol: denies  Illicit Drugs: denies    REVIEW OF SYSTEMS:  CONSTITUTIONAL: No weakness, fevers or chills  HEENT: No visual changes; No vertigo or throat pain   NECK: No pain or stiffness  RESPIRATORY: No cough, wheezing, hemoptysis; No shortness of breath  CARDIOVASCULAR: No chest pain or palpitations  GASTROINTESTINAL: No abdominal or epigastric pain. No nausea, vomiting, or hematemesis; No diarrhea or constipation. No melena or hematochezia.  GENITOURINARY: No dysuria, frequency or hematuria  NEUROLOGICAL: No numbness or weakness  SKIN: No itching, burning, rashes, or lesions   All other 10 review of systems is negative unless indicated above.    Vital Signs Last 24 Hrs  T(C): 36.8 (09 Apr 2019 05:53), Max: 37.1 (08 Apr 2019 18:35)  T(F): 98.2 (09 Apr 2019 05:53), Max: 98.8 (08 Apr 2019 18:35)  HR: 66 (09 Apr 2019 05:53) (66 - 80)  BP: 122/71 (09 Apr 2019 05:53) (110/58 - 122/71)  BP(mean): --  RR: 18 (09 Apr 2019 05:53) (16 - 18)  SpO2: 94% (09 Apr 2019 05:53) (94% - 98%)      PHYSICAL EXAM:    General: Well developed; well nourished; in no acute distress  Eyes: Anicteric sclerae, moist conjunctivae  HENT: Moist mucous membranes  Neck: Trachea midline, supple  Lungs: Normal respiratory effors and no intercostal retractions  Cardiovascular: RRR  Abdomen: Soft, non-tender non-distended; Normal bowel sounds; No rebound or guarding  Extremities: Normal range of motion, No clubbing, cyanosis or edema  Neurological: Alert and oriented x3  Skin: Warm and dry. No obvious rash  Rectal : refused, per primary team no blood in stool on exam yesterday    LABS:                        7.8    10.96 )-----------( 267      ( 09 Apr 2019 00:21 )             26.2     04-09    139  |  106  |  13  ----------------------------<  98  4.0   |  26  |  0.56    Ca    8.0<L>      09 Apr 2019 00:21    TPro  5.8<L>  /  Alb  2.4<L>  /  TBili  0.3  /  DBili  <0.2  /  AST  12  /  ALT  10  /  AlkPhos  97  04-09        PT/INR - ( 08 Apr 2019 12:57 )   PT: 11.3 sec;   INR: 1.02          PTT - ( 08 Apr 2019 12:57 )  PTT:30.8 sec    RADIOLOGY & ADDITIONAL STUDIES:

## 2019-04-09 NOTE — H&P ADULT - NSHPLABSRESULTS_GEN_ALL_CORE
.  LABS:                         7.8    10.96 )-----------( 267      ( 2019 00:21 )             26.2         139  |  106  |  13  ----------------------------<  98  4.0   |  26  |  0.56    Ca    8.0<L>      2019 00:21    TPro  5.8<L>  /  Alb  2.4<L>  /  TBili  0.3  /  DBili  <0.2  /  AST  12  /  ALT  10  /  AlkPhos  97      PT/INR - ( 2019 12:57 )   PT: 11.3 sec;   INR: 1.02          PTT - ( 2019 12:57 )  PTT:30.8 sec  Urinalysis Basic - ( 2019 15:03 )    Color: Yellow / Appearance: Clear / S.015 / pH: x  Gluc: x / Ketone: NEGATIVE  / Bili: NEGATIVE / Urobili: 0.2 E.U./dL   Blood: x / Protein: NEGATIVE mg/dL / Nitrite: NEGATIVE   Leuk Esterase: NEGATIVE / RBC: < 5 /HPF / WBC < 5 /HPF   Sq Epi: x / Non Sq Epi: 0-5 /HPF / Bacteria: Few /HPF                RADIOLOGY, EKG & ADDITIONAL TESTS: Reviewed.

## 2019-04-09 NOTE — DISCHARGE NOTE PROVIDER - PROVIDER TOKENS
PROVIDER:[TOKEN:[[id number]]],PROVIDER:[TOKEN:[[id number]]] PROVIDER:[TOKEN:[[id number]]],PROVIDER:[TOKEN:[[id number]]],FREE:[LAST:[Grelian],FIRST:[Brendan],PHONE:[phone]:[(   [phone]    -],ADDRESS:[[address] [place] [address]],FOLLOWUP:[1 week]]

## 2019-04-09 NOTE — PROGRESS NOTE ADULT - PROBLEM SELECTOR PLAN 3
Pt extremely thin, however endorses good po intake. Patient says she has a particular diet and eats every 2 hours. albumin 2.9   - Nutrition and dietician consult    #hypoalbuminemia   likely in setting of poor po intake   Will continue to trend   Plan as above    #leukocytosis likely 2/2 dehydration as patient with no signs of infection. Pt with no fever, HDS, no cough, or urinary sx. Likely dehydration as sx improved with fluids. - trend CBC Pt with LE edema, no hx of CHF in the past. No medications that cause LE edema  Had ECHO in Feb with cardiologist, when she had LE swelling present at that time and was WNL, however do not have report, will need collateral.   Pt states she had SCDs and hep sub q on prior hospitalization and swelling improved. She states that her swelling has improved from prior, however is mostly in bed   - c/w SCDs  - LE doppler t r/u DVT as pt stationary  - f/up echocardiogram. Pt with LE edema, no hx of CHF in the past. No medications that cause LE edema  Had ECHO in Feb with cardiologist, when she had LE swelling present at that time and was WNL, however do not have report, will need collateral.   - Pt with echo in 2/2019 with EF of 60-65% and Grade I Diastolic dysfunction.   Pt states she had SCDs and hep sub q on prior hospitalization and swelling improved. She states that her swelling has improved from prior, however is mostly in bed   - c/w SCDs  - LE doppler t r/u DVT as pt stationary

## 2019-04-09 NOTE — PROGRESS NOTE ADULT - PROBLEM SELECTOR PLAN 1
likely lower GI bleed by hx; h/o hemorrhoids; GI following, plan for flex sig tomorrow (Pt. refuses full colonoscopy); monitor CBC, keep Hb > 7; Pt. takes iron supplements at home

## 2019-04-09 NOTE — DISCHARGE NOTE PROVIDER - CARE PROVIDERS DIRECT ADDRESSES
,DirectAddress_Unknown,[email or web address] ,DirectAddress_Unknown,[email or web address],DirectAddress_Unknown

## 2019-04-09 NOTE — PROGRESS NOTE ADULT - PROBLEM SELECTOR PLAN 6
DVT: Hold hep sub q in setting of BRBPR , SCDs  GI: PPI 40IV BID     FULL CODE  RMF Pt with dementia, 0/3 word recall, tangential thought and cannot remember what happened when. More likely related to old age however will rule out other causes.   - f/u b12, folate, tsh

## 2019-04-09 NOTE — DISCHARGE NOTE PROVIDER - NSDCCPCAREPLAN_GEN_ALL_CORE_FT
PRINCIPAL DISCHARGE DIAGNOSIS  Diagnosis: Gastrointestinal hemorrhage, unspecified gastrointestinal hemorrhage type  Assessment and Plan of Treatment: PRINCIPAL DISCHARGE DIAGNOSIS  Diagnosis: Gastrointestinal hemorrhage, unspecified gastrointestinal hemorrhage type  Assessment and Plan of Treatment: You presented with symptoms of bloody bowel movements and diarhea. GI was consulted and Flexible sigmoidoscopy was performed which shows finding of Inflamatory Bowel Disease; biopsy is pending. If was recommended for you to start steroid therapy however you refused. Please follow up with GI Dr. Montesinos within 1 week of discharge.      SECONDARY DISCHARGE DIAGNOSES  Diagnosis: Anemia  Assessment and Plan of Treatment: You have a history of Iron deficiency anemia. Please continue iron supplementation as prescribed. PRINCIPAL DISCHARGE DIAGNOSIS  Diagnosis: Gastrointestinal hemorrhage, unspecified gastrointestinal hemorrhage type  Assessment and Plan of Treatment: You presented with symptoms of bloody bowel movements and diarhea. GI was consulted and Flexible sigmoidoscopy was performed which shows finding of Inflamatory Bowel Disease; biopsy is pending. If was recommended for you to start steroid therapy however you refused. Please follow up with IBD Clinic with Dr. Dillard Monday April 15, 2019 at 11:30am.      SECONDARY DISCHARGE DIAGNOSES  Diagnosis: Anemia  Assessment and Plan of Treatment: You have a history of Iron deficiency anemia. Please continue iron supplementation as prescribed.

## 2019-04-09 NOTE — DIETITIAN INITIAL EVALUATION ADULT. - PROBLEM SELECTOR PLAN 2
Pt with LE edema, no hx of CHF in the past  No medications that cause LE edema  Had ECHO in Feb with cardiologist, when she had LE swelling present at that time and was WNL, however do not have report, will need collateral  Pt states she had SCDs and hep sub q on prior hospitalization and swelling improved  She states that her swelling has improved from prior, however is mostly in bed   - SCDs  - LE doppler t r/u DVT as pt stationary

## 2019-04-09 NOTE — PROGRESS NOTE ADULT - PROBLEM SELECTOR PLAN 1
The patient is presenting with BRBPR for the past few days at home. She denies any SOB, dizziness, lightheadedness. Pt has hx of DONAVAN, prior hgb was 9. Rectal exam normal, no hemroids or bleeding. FOBT postitive.   - monitor CBC at this time, no lonnie bleeding currently and HDS   - active type and screen   - GI consult, f/uop recs for possible scope  - c/w pantoprazole 40mg IVP BID  - monitor vitals per RMF The patient is presenting with BRBPR for the past few days at home. She denies any SOB, dizziness, lightheadedness. Pt has hx of DONAVAN, prior hgb was 9. Rectal exam normal, no hemroids or bleeding. FOBT postitive.   - monitor CBC at this time, no lonnie bleeding currently and HDS   - active type and screen   - GI consult, f/uop recs   - Plan for flex sig tomm.   - npo after midnight   - c/w pantoprazole 40mg IVP BID  - monitor vitals per RMF

## 2019-04-10 ENCOUNTER — TRANSCRIPTION ENCOUNTER (OUTPATIENT)
Age: 84
End: 2019-04-10

## 2019-04-10 ENCOUNTER — RESULT REVIEW (OUTPATIENT)
Age: 84
End: 2019-04-10

## 2019-04-10 DIAGNOSIS — E87.6 HYPOKALEMIA: ICD-10-CM

## 2019-04-10 LAB
ANION GAP SERPL CALC-SCNC: 5 MMOL/L — SIGNIFICANT CHANGE UP (ref 5–17)
ANION GAP SERPL CALC-SCNC: 8 MMOL/L — SIGNIFICANT CHANGE UP (ref 5–17)
BUN SERPL-MCNC: 13 MG/DL — SIGNIFICANT CHANGE UP (ref 7–23)
BUN SERPL-MCNC: 9 MG/DL — SIGNIFICANT CHANGE UP (ref 7–23)
CALCIUM SERPL-MCNC: 7.5 MG/DL — LOW (ref 8.4–10.5)
CALCIUM SERPL-MCNC: 8.1 MG/DL — LOW (ref 8.4–10.5)
CHLORIDE SERPL-SCNC: 104 MMOL/L — SIGNIFICANT CHANGE UP (ref 96–108)
CHLORIDE SERPL-SCNC: 107 MMOL/L — SIGNIFICANT CHANGE UP (ref 96–108)
CO2 SERPL-SCNC: 24 MMOL/L — SIGNIFICANT CHANGE UP (ref 22–31)
CO2 SERPL-SCNC: 28 MMOL/L — SIGNIFICANT CHANGE UP (ref 22–31)
CREAT SERPL-MCNC: 0.49 MG/DL — LOW (ref 0.5–1.3)
CREAT SERPL-MCNC: 0.78 MG/DL — SIGNIFICANT CHANGE UP (ref 0.5–1.3)
GAS PNL BLDV: SIGNIFICANT CHANGE UP
GLUCOSE SERPL-MCNC: 101 MG/DL — HIGH (ref 70–99)
GLUCOSE SERPL-MCNC: 82 MG/DL — SIGNIFICANT CHANGE UP (ref 70–99)
HCT VFR BLD CALC: 28.5 % — LOW (ref 34.5–45)
HGB BLD-MCNC: 8.4 G/DL — LOW (ref 11.5–15.5)
MAGNESIUM SERPL-MCNC: 1.9 MG/DL — SIGNIFICANT CHANGE UP (ref 1.6–2.6)
MCHC RBC-ENTMCNC: 26.4 PG — LOW (ref 27–34)
MCHC RBC-ENTMCNC: 29.5 GM/DL — LOW (ref 32–36)
MCV RBC AUTO: 89.6 FL — SIGNIFICANT CHANGE UP (ref 80–100)
NRBC # BLD: 0 /100 WBCS — SIGNIFICANT CHANGE UP (ref 0–0)
PLATELET # BLD AUTO: 281 K/UL — SIGNIFICANT CHANGE UP (ref 150–400)
POTASSIUM SERPL-MCNC: 3.3 MMOL/L — LOW (ref 3.5–5.3)
POTASSIUM SERPL-MCNC: 4.3 MMOL/L — SIGNIFICANT CHANGE UP (ref 3.5–5.3)
POTASSIUM SERPL-SCNC: 3.3 MMOL/L — LOW (ref 3.5–5.3)
POTASSIUM SERPL-SCNC: 4.3 MMOL/L — SIGNIFICANT CHANGE UP (ref 3.5–5.3)
RBC # BLD: 3.18 M/UL — LOW (ref 3.8–5.2)
RBC # FLD: 16.7 % — HIGH (ref 10.3–14.5)
SODIUM SERPL-SCNC: 137 MMOL/L — SIGNIFICANT CHANGE UP (ref 135–145)
SODIUM SERPL-SCNC: 139 MMOL/L — SIGNIFICANT CHANGE UP (ref 135–145)
WBC # BLD: 10.73 K/UL — HIGH (ref 3.8–10.5)
WBC # FLD AUTO: 10.73 K/UL — HIGH (ref 3.8–10.5)

## 2019-04-10 PROCEDURE — 99233 SBSQ HOSP IP/OBS HIGH 50: CPT

## 2019-04-10 PROCEDURE — 45330 DIAGNOSTIC SIGMOIDOSCOPY: CPT

## 2019-04-10 RX ORDER — HYDROCORTISONE 20 MG
100 TABLET ORAL AT BEDTIME
Qty: 0 | Refills: 0 | Status: DISCONTINUED | OUTPATIENT
Start: 2019-04-10 | End: 2019-04-12

## 2019-04-10 RX ORDER — LANOLIN ALCOHOL/MO/W.PET/CERES
5 CREAM (GRAM) TOPICAL AT BEDTIME
Qty: 0 | Refills: 0 | Status: DISCONTINUED | OUTPATIENT
Start: 2019-04-10 | End: 2019-04-12

## 2019-04-10 RX ORDER — POTASSIUM CHLORIDE 20 MEQ
20 PACKET (EA) ORAL
Qty: 0 | Refills: 0 | Status: COMPLETED | OUTPATIENT
Start: 2019-04-10 | End: 2019-04-10

## 2019-04-10 RX ORDER — ACETAMINOPHEN 500 MG
650 TABLET ORAL ONCE
Qty: 0 | Refills: 0 | Status: COMPLETED | OUTPATIENT
Start: 2019-04-10 | End: 2019-04-10

## 2019-04-10 RX ADMIN — Medication 5 MILLIGRAM(S): at 22:26

## 2019-04-10 RX ADMIN — Medication 20 MILLIEQUIVALENT(S): at 09:52

## 2019-04-10 RX ADMIN — Medication 20 MILLIEQUIVALENT(S): at 09:53

## 2019-04-10 RX ADMIN — Medication 650 MILLIGRAM(S): at 21:56

## 2019-04-10 RX ADMIN — Medication 650 MILLIGRAM(S): at 22:27

## 2019-04-10 NOTE — PROGRESS NOTE ADULT - PROBLEM SELECTOR PLAN 8
DVT: Hold hep sub q in setting of BRBPR , SCDs  GI: PPI 40IV BID     FULL CODE  RMF DVT: Hold hep sub q in setting of BRBPR , SCDs  GI: none     FULL CODE  RMF

## 2019-04-10 NOTE — PROGRESS NOTE ADULT - PROBLEM SELECTOR PLAN 4
Pt extremely thin, however endorses good po intake. Patient says she has a particular diet and eats every 2 hours. albumin 2.9   - Nutrition and dietician consult    #hypoalbuminemia   likely in setting of poor po intake   Will continue to trend   Plan as above    #leukocytosis likely 2/2 dehydration as patient with no signs of infection. Pt with no fever, HDS, no cough, or urinary sx. Likely dehydration as sx improved with fluids. - trend CBC  - NOW RESOLVED Pt extremely thin, however endorses good po intake. Patient says she has a particular diet and eats every 2 hours. albumin 2.9   - Nutrition and dietician consult    #hypoalbuminemia   likely in setting of poor po intake   Will continue to trend   Plan as above    #leukocytosis likely 2/2 dehydration as patient with no signs of infection. Pt with no fever, HDS, no cough, or urinary sx. Likely dehydration as sx improved with fluids. - trend CBC  - likely 2/2 IBD flare   - continue to trend

## 2019-04-10 NOTE — PROGRESS NOTE ADULT - PROBLEM SELECTOR PLAN 2
Pt. w/ possible early dementia as well; provide frequent re-orientation, assist w/ ADLs; appreciate Psych recs

## 2019-04-10 NOTE — DISCHARGE NOTE NURSING/CASE MANAGEMENT/SOCIAL WORK - NSDCDPATPORTLINK_GEN_ALL_CORE
You can access the [facility] Portal, offered by [facility], by registering with the following website: [email or web address]

## 2019-04-10 NOTE — PROGRESS NOTE ADULT - SUBJECTIVE AND OBJECTIVE BOX
Patient is a 83y old  Female who presents with a chief complaint of BRBPR (10 Apr 2019 12:22)      INTERVAL HPI/OVERNIGHT EVENTS:  ICU Vital Signs Last 24 Hrs  T(C): 36.4 (10 Apr 2019 09:18), Max: 36.8 (10 Apr 2019 05:22)  T(F): 97.6 (10 Apr 2019 09:18), Max: 98.2 (10 Apr 2019 05:22)  HR: 67 (10 Apr 2019 09:18) (67 - 82)  BP: 127/69 (10 Apr 2019 09:18) (110/68 - 127/69)  BP(mean): 82 (2019 15:50) (82 - 82)  ABP: --  ABP(mean): --  RR: 18 (10 Apr 2019 09:18) (17 - 18)  SpO2: 96% (10 Apr 2019 09:18) (95% - 97%)    I&O's Summary    2019 07:  -  10 Apr 2019 07:00  --------------------------------------------------------  IN: 480 mL / OUT: 200 mL / NET: 280 mL    10 Apr 2019 07:01  -  10 Apr 2019 13:33  --------------------------------------------------------  IN: 40 mL / OUT: 0 mL / NET: 40 mL          LABS:                        8.4    10.73 )-----------( 281      ( 10 Apr 2019 06:52 )             28.5     04-10    139  |  107  |  9   ----------------------------<  82  3.3<L>   |  24  |  0.49<L>    Ca    7.5<L>      10 Apr 2019 06:52  Mg     1.9     04-10    TPro  6.1  /  Alb  2.3<L>  /  TBili  0.3  /  DBili  x   /  AST  11  /  ALT  9<L>  /  AlkPhos  98  04-09      Urinalysis Basic - ( 2019 15:03 )    Color: Yellow / Appearance: Clear / S.015 / pH: x  Gluc: x / Ketone: NEGATIVE  / Bili: NEGATIVE / Urobili: 0.2 E.U./dL   Blood: x / Protein: NEGATIVE mg/dL / Nitrite: NEGATIVE   Leuk Esterase: NEGATIVE / RBC: < 5 /HPF / WBC < 5 /HPF   Sq Epi: x / Non Sq Epi: 0-5 /HPF / Bacteria: Few /HPF      CAPILLARY BLOOD GLUCOSE            RADIOLOGY & ADDITIONAL TESTS:    Consultant(s) Notes Reviewed:  [x ] YES  [ ] NO    MEDICATIONS  (STANDING):  hydrocortisone Enema 100 milliGRAM(s) Rectal at bedtime  multivitamin 1 Tablet(s) Oral daily  sodium chloride 0.9%. 1000 milliLiter(s) (40 mL/Hr) IV Continuous <Continuous>    MEDICATIONS  (PRN):      PHYSICAL EXAM:  GENERAL: well built, well nourished  HEAD:  Atraumatic, Normocephalic  EYES: EOMI, PERRLA, conjunctiva and sclera clear  ENT: No tonsillar erythema, exudates, or enlargement; Moist mucous membranes, Good dentition, No lesions  NECK: Supple, No JVD, Normal thyroid, no enlarged nodes  NERVOUS SYSTEM:  Alert & Oriented X3, Good concentration; Motor Strength 5/5 B/L upper and lower extremities; DTRs 2+ intact and symmetric, sensory intact  CHEST/LUNG: B/L good air entry; No rales, rhonchi, or wheezing  HEART: S1S2 normal, no S3, Regular rate and rhythm; No murmurs, rubs, or gallops  ABDOMEN: Soft, Nontender, Nondistended; Bowel sounds present  EXTREMITIES:  2+ Peripheral Pulses, No clubbing, cyanosis, or edema  LYMPH: No lymphadenopathy noted  SKIN: No rashes or lesions    Care Discussed with Consultants/Other Providers [ x] YES  [ ] NO OVERNIGHT EVENTS: Non bloody diarhea x3.     INTERVAL HPI: Patient is examined at bedside. She appears well, in NAD. She denies any CP, SOB, abdominal pain. Patient says she had 3 bm overnight which were loose but non bloody.     ICU Vital Signs Last 24 Hrs  T(C): 36.4 (10 Apr 2019 09:18), Max: 36.8 (10 Apr 2019 05:22)  T(F): 97.6 (10 Apr 2019 09:18), Max: 98.2 (10 Apr 2019 05:22)  HR: 67 (10 Apr 2019 09:18) (67 - 82)  BP: 127/69 (10 Apr 2019 09:18) (110/68 - 127/69)  BP(mean): 82 (2019 15:50) (82 - 82)  ABP: --  ABP(mean): --  RR: 18 (10 Apr 2019 09:18) (17 - 18)  SpO2: 96% (10 Apr 2019 09:18) (95% - 97%)    I&O's Summary    2019 07:01  -  10 Apr 2019 07:00  --------------------------------------------------------  IN: 480 mL / OUT: 200 mL / NET: 280 mL    10 Apr 2019 07:01  -  10 Apr 2019 13:33  --------------------------------------------------------  IN: 40 mL / OUT: 0 mL / NET: 40 mL          LABS:                        8.4    10.73 )-----------( 281      ( 10 Apr 2019 06:52 )             28.5     04-10    139  |  107  |  9   ----------------------------<  82  3.3<L>   |  24  |  0.49<L>    Ca    7.5<L>      10 Apr 2019 06:52  Mg     1.9     04-10    TPro  6.1  /  Alb  2.3<L>  /  TBili  0.3  /  DBili  x   /  AST  11  /  ALT  9<L>  /  AlkPhos  98  04-09      Urinalysis Basic - ( 2019 15:03 )    Color: Yellow / Appearance: Clear / S.015 / pH: x  Gluc: x / Ketone: NEGATIVE  / Bili: NEGATIVE / Urobili: 0.2 E.U./dL   Blood: x / Protein: NEGATIVE mg/dL / Nitrite: NEGATIVE   Leuk Esterase: NEGATIVE / RBC: < 5 /HPF / WBC < 5 /HPF   Sq Epi: x / Non Sq Epi: 0-5 /HPF / Bacteria: Few /HPF      CAPILLARY BLOOD GLUCOSE            RADIOLOGY & ADDITIONAL TESTS:    Consultant(s) Notes Reviewed:  [x ] YES  [ ] NO    MEDICATIONS  (STANDING):  hydrocortisone Enema 100 milliGRAM(s) Rectal at bedtime  multivitamin 1 Tablet(s) Oral daily  sodium chloride 0.9%. 1000 milliLiter(s) (40 mL/Hr) IV Continuous <Continuous>    MEDICATIONS  (PRN):    PHYSICAL EXAM:  GENERAL: Elderly female in NAD.   HEAD:  Atraumatic, Normocephalic  EYES: EOMI, PERRLA, conjunctiva and sclera clear  NECK: Supple, No JVD, Normal thyroid, no enlarged nodes  NERVOUS SYSTEM:  Alert & Oriented X3,  Motor Strength 5/5 B/L upper and lower extremities; DTRs 2+ intact and symmetric, sensory intact  CHEST/LUNG: B/L good air entry; No rales, rhonchi, or wheezing  HEART: S1S2 normal, no S3, Regular rate and rhythm; No murmurs, rubs, or gallops  ABDOMEN: Soft, Nontender, Nondistended; Bowel sounds present  EXTREMITIES:  2+ pitting edema   LYMPH: No lymphadenopathy noted  SKIN: No rashes or lesions    Care Discussed with Consultants/Other Providers [ x] YES  [ ] NO

## 2019-04-10 NOTE — PROGRESS NOTE ADULT - SUBJECTIVE AND OBJECTIVE BOX
Patient is a 83y old  Female who presents with a chief complaint of BRBPR (2019 15:53)      INTERVAL HPI/OVERNIGHT EVENTS:    Pt. seen and examined at 9AM  Pt. c/o diarrhea yesterday, non-bloody  Denies CP, SOB, fatigue, lightheadedness    Review of Systems: 12 point review of systems otherwise negative    MEDICATIONS  (STANDING):  hydrocortisone Enema 100 milliGRAM(s) Rectal at bedtime  multivitamin 1 Tablet(s) Oral daily  sodium chloride 0.9%. 1000 milliLiter(s) (40 mL/Hr) IV Continuous <Continuous>    MEDICATIONS  (PRN):      Allergies    Cipro (Anaphylaxis)  Gluten (Anaphylaxis)  lactose (Anaphylaxis)  Nuts (Anaphylaxis)    Intolerances          Vital Signs Last 24 Hrs  T(C): 36.4 (10 Apr 2019 09:18), Max: 36.8 (10 Apr 2019 05:22)  T(F): 97.6 (10 Apr 2019 09:18), Max: 98.2 (10 Apr 2019 05:22)  HR: 67 (10 Apr 2019 09:18) (67 - 82)  BP: 127/69 (10 Apr 2019 09:18) (110/68 - 127/69)  BP(mean): 82 (2019 15:50) (82 - 82)  RR: 18 (10 Apr 2019 09:18) (17 - 18)  SpO2: 96% (10 Apr 2019 09:18) (95% - 97%)  CAPILLARY BLOOD GLUCOSE           @ 07:01  -  04-10 @ 07:00  --------------------------------------------------------  IN: 480 mL / OUT: 200 mL / NET: 280 mL    04-10 @ 07:  -  10 @ 12:22  --------------------------------------------------------  IN: 40 mL / OUT: 0 mL / NET: 40 mL        Physical Exam:  (at 9AM)  Daily     Daily Weight in k (2019 15:31)  General:  comfortable-appearing in NAD; underweight  HEENT:  anicteric, no pallor  CV:  no JVD  Extremities:  B/L LE edema (chronic, per Pt.), non-tender and symmetric   Skin:  WWP  Neuro:  AAOx3    LABS:                        8.4    10.73 )-----------( 281      ( 10 Apr 2019 06:52 )             28.5     04-10    139  |  107  |  9   ----------------------------<  82  3.3<L>   |  24  |  0.49<L>    Ca    7.5<L>      10 Apr 2019 06:52  Mg     1.9     04-10    TPro  6.1  /  Alb  2.3<L>  /  TBili  0.3  /  DBili  x   /  AST  11  /  ALT  9<L>  /  AlkPhos  98  04-09    PT/INR - ( 2019 12:57 )   PT: 11.3 sec;   INR: 1.02          PTT - ( 2019 12:57 )  PTT:30.8 sec  Urinalysis Basic - ( 2019 15:03 )    Color: Yellow / Appearance: Clear / S.015 / pH: x  Gluc: x / Ketone: NEGATIVE  / Bili: NEGATIVE / Urobili: 0.2 E.U./dL   Blood: x / Protein: NEGATIVE mg/dL / Nitrite: NEGATIVE   Leuk Esterase: NEGATIVE / RBC: < 5 /HPF / WBC < 5 /HPF   Sq Epi: x / Non Sq Epi: 0-5 /HPF / Bacteria: Few /HPF          RADIOLOGY & ADDITIONAL TESTS:    ---------------------------------------------------------------------------  I personally reviewed: [  ]EKG   [  ]CXR    [  ] CT    [  ]Other  ---------------------------------------------------------------------------  PLEASE CHECK WHEN PRESENT:     [  ]Heart Failure     [  ] Acute     [  ] Acute on Chronic     [  ] Chronic  -------------------------------------------------------------------     [  ]Diastolic [HFpEF]     [  ]Systolic [HFrEF]     [  ]Combined [HFpEF & HFrEF]     [  ]Other:  -------------------------------------------------------------------  [  ]ZULEMA     [  ]ATN     [  ]Reneal Medullary Necrosis     [  ]Renal Cortical Necrosis     [  ]Other Pathological Lesions:    [  ]CKD 1  [  ]CKD 2  [  ]CKD 3  [  ]CKD 4  [  ]CKD 5  [  ]Other  -------------------------------------------------------------------  [  ]Other/Unspecified:    --------------------------------------------------------------------    Abdominal Nutritional Status  [  ]Malnutrition: See Nutrition Note  [  ]Cachexia  [  ]Other:   [  ]Supplement Ordered:  [  ]Morbid Obesity (BMI >=40]

## 2019-04-10 NOTE — PROGRESS NOTE ADULT - SUBJECTIVE AND OBJECTIVE BOX
Physical Medicine and Rehabilitation Progress Note:    Patient is a 83y old  Female who presents with a chief complaint of BRBPR (10 Apr 2019 13:32)      HPI:  This is a 83 year old female with pmhx of iron deficiency anemia, hemorrhoids, osteoporsis, R hip replacement LE edema, dementia who presents with BRBPR. The patient is a poor historian and was difficult to assess history and why she was in the hospital. The patient states that about in March, she started to have abdominal pain and distention prompting her to go to the ED at [facility]. She states that nothing happened there and she was discharged home. However, per the records, the patient had LE edema, was given DVT ppx and then discharged home as her workup was negative at that time. She then has had blood in the bowl of her water, not mixed in with stool that she noted at home. She also notes that she has been feeling more weak than usual, but has had a good appetite. Her PMD sent her in for possible GIB and neuropysch workup in setting of DM. The patient currently denies any symptoms besides feeling tired, LE edema that has improved and not having seen a doctor in person to talk to. Denies fever, chills, SOB, CP, abdominal pain, v/n/d/c, urinary changes, numbness or tingling in his extremities.    Of note, the patient has seen GI and cardiology in the past. Has had negative workup, with exam showing anal fissure and hemorrhoids. Declined colonoscopy on prior admission to OSH, and states that she was okay. She had a hip replacement after a fall at home, no complications from procedure. She notes that she needs aid at home as she cannot perform her ADLs    In the ED: Vitals: T 98.2, HR 74, /68, RR 16, 94%SpO2. Labs sig for WBC 11.1, Hgb 8.3, albumin 1.9, FOBT positive. Pt received 1L NS started on PPI drip and sent to [facility].      At [facility]: T 98.1, HR 75, /73, RR 18, SpO2 97%. (09 Apr 2019 00:22)                            8.4    10.73 )-----------( 281      ( 10 Apr 2019 06:52 )             28.5       04-10    139  |  107  |  9   ----------------------------<  82  3.3<L>   |  24  |  0.49<L>    Ca    7.5<L>      10 Apr 2019 06:52  Mg     1.9     04-10    TPro  6.1  /  Alb  2.3<L>  /  TBili  0.3  /  DBili  x   /  AST  11  /  ALT  9<L>  /  AlkPhos  98  04-09    Vital Signs Last 24 Hrs  T(C): 36.4 (10 Apr 2019 15:28), Max: 36.8 (10 Apr 2019 05:22)  T(F): 97.5 (10 Apr 2019 15:28), Max: 98.2 (10 Apr 2019 05:22)  HR: 76 (10 Apr 2019 15:28) (67 - 82)  BP: 97/67 (10 Apr 2019 15:28) (97/67 - 127/69)  BP(mean): 82 (09 Apr 2019 15:50) (82 - 82)  RR: 18 (10 Apr 2019 15:28) (17 - 18)  SpO2: 98% (10 Apr 2019 15:28) (95% - 98%)    MEDICATIONS  (STANDING):  hydrocortisone Enema 100 milliGRAM(s) Rectal at bedtime  multivitamin 1 Tablet(s) Oral daily    MEDICATIONS  (PRN):    Currently Undergoing Physical Therapy at bedside.    Functional Status Assessment:    Previous Level of Function:     · Ambulation Skills	needs device; QC vs RW	  · Transfer Skills	needs device; QC vs RW	  · ADL Skills	needed assist	  · Additional Comments	Pt. reports living in an elevator building with few steps to enter. Pt. reports using mostly QC. Pt. herself is an unreliable historian and has been providing inconsistent information in regards to the amount of HHA received at home. Per chart review, pt. has 12 hrs/day x 7 days of private hire HHA and owns a walker in addition to QC.	    Cognitive Status Examination:   · Orientation	person; place	  · Level of Consciousness	alert	  · Follows Commands and Answers Questions	100% of the time	  · Personal Safety and Judgment	impaired	  · Short Term Memory	impaired	    Range of Motion Exam:   · Range of Motion Examination	ROM all 4 extremities WFL by functional assessment	    Manual Muscle Testing:   · Manual Muscle Testing Results	>3+/5 by functional assessment against gravity	    Bed Mobility: Scooting/Bridging:     · Level of Pablo	contact guard	  · Assistive Device	bed rails	    Bed Mobility: Sit to Supine:     · Level of Pablo	NT - pt was left sitting.	    Bed Mobility: Supine to Sit:     · Level of Pablo	supervision	    Transfer: Sit to Stand:     · Level of Pablo	contact guard	  · Physical Assist/Nonphysical Assist	1 person assist	  · Weight-Bearing Restrictions	full weight-bearing	  · Assistive Device	rolling walker	    Transfer: Stand to Sit:     · Level of Pablo	contact guard	  · Physical Assist/Nonphysical Assist	verbal cues; 1 person assist	  · Weight-Bearing Restrictions	full weight-bearing	  · Assistive Device	rolling walker; vs no AD	    Sit/Stand Transfer Safety Analysis:     · Transfer Safety Concerns Noted	decreased weight-shifting ability	  · Impairments Contributing to Impaired Transfers	impaired balance; decreased strength	    Gait Skills:     · Level of Pablo	supervision; contact guard	  · Physical Assist/Nonphysical Assist	1 person assist	  · Weight-Bearing Restrictions	full weight-bearing	  · Assistive Device	rolling walker	  · Gait Distance	40'x2	    Gait Analysis:     · Gait Deviations Noted	decreased stephane; decreased step length	  · Impairments Contributing to Gait Deviations	impaired balance	    Stair Negotiation:     · Level of Pablo	TBA	    Balance Skills Assessment:     · Sitting Balance: Static	good balance	  · Sitting Balance: Dynamic	good balance	  · Sit-to-Stand Balance	fair plus	  · Standing Balance: Static	fair plus	  · Standing Balance: Dynamic	fair balance	  · Identified Impairments Contributing to Balance Disturbance	decreased strength; pain	    Clinical Impressions:   · Criteria for Skilled Therapeutic Interventions	impairments found; functional limitations in following categories	  · Impairments Found (describe specific impairments)	aerobic capacity/endurance; gait, locomotion, and balance; poor safety awareness	  · Functional Limitations in Following Categories (describe specific limitations)	self-care; home management; community/leisure	  · Risk Reduction/Prevention (Describe Specific Areas of risk reduction/prevention)	risk factors	  · Risk Areas	fall	  · Rehab Potential	good, to achieve stated therapy goals	  · Therapy Frequency	2-3x/week	  · Predicted Duration of Therapy Intervention (days/wks)	Pt. would benefit from further PT follow up to improve transfers, gait, strength, balance, endurance.	  · Anticipated Equipment Needs at Discharge	Rollator if pt. does not own one	        PM&R Impression: as above    Disposition Plan Recommendations: d/c home with home physical therapy, home care services

## 2019-04-10 NOTE — PROGRESS NOTE ADULT - PROBLEM SELECTOR PLAN 3
likely d/t hypoalbuminemia, d/t malnutrition; elevate legs; no suspicion for DVT, no need to check Dopplers

## 2019-04-10 NOTE — PROGRESS NOTE ADULT - PROBLEM SELECTOR PLAN 1
The patient is presenting with BRBPR for the past few days at home. She denies any SOB, dizziness, lightheadedness. Pt has hx of DONAVAN, prior hgb was 9. Rectal exam normal, no hemroids or bleeding. FOBT postitive.   - monitor CBC at this time, no lonnie bleeding currently and HDS   - active type and screen   - GI consult, f/uop recs   - Plan for flex sig tomm.   - npo after midnight   - c/w pantoprazole 40mg IVP BID  - monitor vitals per RMF The patient is presenting with BRBPR for the past few days at home. She denies any SOB, dizziness, lightheadedness. Pt has hx of DONAVAN, prior hgb was 9. Rectal exam normal, no hemroids or bleeding. FOBT postitive.   - Results of sigmoidoscopy: Findings of possible IBD.   - hydrocortisone enema   - monitor CBC at this time, no lonnie bleeding currently and HDS   - active type and screen   - GI consult, f/uop recs   - monitor vitals per RMF

## 2019-04-10 NOTE — PROGRESS NOTE ADULT - ASSESSMENT
This is a 83 year old female with pmhx of iron deficiency anemia, hemorrhoids, osteoporosis R hip replacement LE edema, dementia who presents with BRBPR. Admitted for BRBPR    Problem/Plan - 1:  ·  Problem: BRBPR (bright red blood per rectum).  Plan: The patient is presenting with BRBPR for the past few days at home. She denies any SOB, dizziness, lightheadedness. Pt has hx of DONAVAN, prior hgb was 9. Rectal exam normal, no hemroids or bleeding. FOBT postitive.   - Results of sigmoidoscopy: Findings of possible IBD.   - hydrocortisone enema   - monitor CBC at this time, no lonnie bleeding currently and HDS   - active type and screen   - GI consult, f/uop recs   - monitor vitals per RMF.    Problem/Plan - 2:  ·  Problem: Anemia.  Plan: Patient with hx of DONAVAN with baseline hgb 9 per outpatient records. Hgb of 8.3 on admission. FOBT+ however pt is on iron supplementation and rectal exam neg for blood. Likely poor nutritional status.   - trend CBC q12hrs   - Active T&S  - Transfusion goal >7  - nutrition consult  - plan above.     Problem/Plan - 3:  ·  Problem: Lower extremity edema.  Plan: Pt with LE edema, no hx of CHF in the past. No medications that cause LE edema  Had ECHO in Feb with cardiologist, when she had LE swelling present at that time and was WNL, however do not have report, will need collateral.   - Pt with echo in 2/2019 with EF of 60-65% and Grade I Diastolic dysfunction.   Pt states she had SCDs and hep sub q on prior hospitalization and swelling improved. She states that her swelling has improved from prior, however is mostly in bed   - c/w SCDs  - LE doppler t r/u DVT as pt stationary.     Problem/Plan - 4:  ·  Problem: Failure to thrive in adult.  Plan: Pt extremely thin, however endorses good po intake. Patient says she has a particular diet and eats every 2 hours. albumin 2.9   - Nutrition and dietician consult    #hypoalbuminemia   likely in setting of poor po intake   Will continue to trend   Plan as above    #leukocytosis likely 2/2 dehydration as patient with no signs of infection. Pt with no fever, HDS, no cough, or urinary sx. Likely dehydration as sx improved with fluids. - trend CBC  - likely 2/2 IBD flare   - continue to trend.    Problem/Plan - 5:  ·  Problem: Depression.  Plan: Patient with history of depression like symptoms and questionable suicidal ideations. Patient notified nurse overnigtht of thoughts of hurting herself; however when asks, says she was joking. Patient denies any plan  - psych consult - no further recs. Pt is not harmful to herself or others.   - consider SSRI.    Problem/Plan - 6:  Problem: Dementia. Plan: Pt with dementia, 0/3 word recall, tangential thought and cannot remember what happened when. More likely related to old age however will rule out other causes.   - f/u b12, folate, tsh- wnl.

## 2019-04-10 NOTE — PROGRESS NOTE ADULT - PROBLEM SELECTOR PLAN 7
F: NS 40cc/hr  E: Replete prn   N: NPO for possible GI procedure F: None  E: Replete prn   N: Regular

## 2019-04-10 NOTE — PROGRESS NOTE ADULT - PROBLEM SELECTOR PLAN 5
Patient with history of depression like symptoms and questionable suicidal ideations. Patient notified nurse overnigtht of thoughts of hurting herself; however when asks, says she was joking. Patient denies any plan  - psych consult  - consider SSRI Patient with history of depression like symptoms and questionable suicidal ideations. Patient notified nurse overnigtht of thoughts of hurting herself; however when asks, says she was joking. Patient denies any plan  - psych consult - no further recs. Pt is not harmful to herself or others.   - consider SSRI

## 2019-04-10 NOTE — PROGRESS NOTE ADULT - PROBLEM SELECTOR PLAN 2
Patient with hx of DONAVAN with baseline hgb 9 per outpatient records. Hgb of 8.3 on admission. FOBT+ however pt is on iron supplementation and rectal exam neg for blood. Likely poor nutritional status.   - trend CBC q12hrs   - Active T&S  - Transfusion goal >7  - nutrition consult  - plan above

## 2019-04-10 NOTE — PROGRESS NOTE ADULT - PROBLEM SELECTOR PLAN 6
Pt with dementia, 0/3 word recall, tangential thought and cannot remember what happened when. More likely related to old age however will rule out other causes.   - f/u b12, folate, tsh Pt with dementia, 0/3 word recall, tangential thought and cannot remember what happened when. More likely related to old age however will rule out other causes.   - f/u b12, folate, tsh- wnl

## 2019-04-10 NOTE — PROGRESS NOTE ADULT - PROBLEM SELECTOR PLAN 1
likely lower GI bleed by hx; h/o hemorrhoids; GI following, plan for flex sig today (Pt. refuses full colonoscopy); monitor CBC, keep Hb > 7; Pt. takes iron supplements at home

## 2019-04-10 NOTE — PROGRESS NOTE ADULT - PROBLEM SELECTOR PLAN 3
Pt with LE edema, no hx of CHF in the past. No medications that cause LE edema  Had ECHO in Feb with cardiologist, when she had LE swelling present at that time and was WNL, however do not have report, will need collateral.   - Pt with echo in 2/2019 with EF of 60-65% and Grade I Diastolic dysfunction.   Pt states she had SCDs and hep sub q on prior hospitalization and swelling improved. She states that her swelling has improved from prior, however is mostly in bed   - c/w SCDs  - LE doppler t r/u DVT as pt stationary

## 2019-04-11 DIAGNOSIS — D64.9 ANEMIA, UNSPECIFIED: ICD-10-CM

## 2019-04-11 LAB
ANION GAP SERPL CALC-SCNC: 7 MMOL/L — SIGNIFICANT CHANGE UP (ref 5–17)
BLD GP AB SCN SERPL QL: NEGATIVE — SIGNIFICANT CHANGE UP
BUN SERPL-MCNC: 17 MG/DL — SIGNIFICANT CHANGE UP (ref 7–23)
CALCIUM SERPL-MCNC: 8.1 MG/DL — LOW (ref 8.4–10.5)
CHLORIDE SERPL-SCNC: 105 MMOL/L — SIGNIFICANT CHANGE UP (ref 96–108)
CO2 SERPL-SCNC: 26 MMOL/L — SIGNIFICANT CHANGE UP (ref 22–31)
CREAT SERPL-MCNC: 0.7 MG/DL — SIGNIFICANT CHANGE UP (ref 0.5–1.3)
GLUCOSE SERPL-MCNC: 93 MG/DL — SIGNIFICANT CHANGE UP (ref 70–99)
HCT VFR BLD CALC: 27 % — LOW (ref 34.5–45)
HGB BLD-MCNC: 7.8 G/DL — LOW (ref 11.5–15.5)
MAGNESIUM SERPL-MCNC: 2 MG/DL — SIGNIFICANT CHANGE UP (ref 1.6–2.6)
MCHC RBC-ENTMCNC: 25.7 PG — LOW (ref 27–34)
MCHC RBC-ENTMCNC: 28.9 GM/DL — LOW (ref 32–36)
MCV RBC AUTO: 89.1 FL — SIGNIFICANT CHANGE UP (ref 80–100)
NRBC # BLD: 0 /100 WBCS — SIGNIFICANT CHANGE UP (ref 0–0)
PLATELET # BLD AUTO: 275 K/UL — SIGNIFICANT CHANGE UP (ref 150–400)
POTASSIUM SERPL-MCNC: 4.1 MMOL/L — SIGNIFICANT CHANGE UP (ref 3.5–5.3)
POTASSIUM SERPL-SCNC: 4.1 MMOL/L — SIGNIFICANT CHANGE UP (ref 3.5–5.3)
RBC # BLD: 3.03 M/UL — LOW (ref 3.8–5.2)
RBC # FLD: 17 % — HIGH (ref 10.3–14.5)
RH IG SCN BLD-IMP: POSITIVE — SIGNIFICANT CHANGE UP
SODIUM SERPL-SCNC: 138 MMOL/L — SIGNIFICANT CHANGE UP (ref 135–145)
WBC # BLD: 11.12 K/UL — HIGH (ref 3.8–10.5)
WBC # FLD AUTO: 11.12 K/UL — HIGH (ref 3.8–10.5)

## 2019-04-11 PROCEDURE — 99231 SBSQ HOSP IP/OBS SF/LOW 25: CPT

## 2019-04-11 PROCEDURE — 99233 SBSQ HOSP IP/OBS HIGH 50: CPT

## 2019-04-11 RX ORDER — CHOLESTYRAMINE 4 G/9G
0 POWDER, FOR SUSPENSION ORAL
Qty: 0 | Refills: 0 | COMMUNITY

## 2019-04-11 RX ADMIN — Medication 1 TABLET(S): at 11:41

## 2019-04-11 RX ADMIN — Medication 5 MILLIGRAM(S): at 21:53

## 2019-04-11 NOTE — PROGRESS NOTE ADULT - SUBJECTIVE AND OBJECTIVE BOX
Patient is a 83y old  Female who presents with a chief complaint of BRBPR (11 Apr 2019 07:44)      INTERVAL HPI/OVERNIGHT EVENTS:    Pt. seen and examined at 11:45AM  No acute complaints  Flex sig results d/w Pt.; Pt. verbalized understanding of suspected IBD dx; per Pt., she has been given this dx before  Pt. refusing hydrocortisone enema; Pt. verbalized understanding of its benefits and risks of refusal; however, Pt. interested in "alternative" and dietary tx  Pt. verbalized understanding of importance of outpatient GI f/u    Review of Systems: 12 point review of systems otherwise negative    MEDICATIONS  (STANDING):  hydrocortisone Enema 100 milliGRAM(s) Rectal at bedtime  melatonin 5 milliGRAM(s) Oral at bedtime  multivitamin 1 Tablet(s) Oral daily    MEDICATIONS  (PRN):      Allergies    Cipro (Anaphylaxis)  Gluten (Anaphylaxis)  lactose (Anaphylaxis)  Nuts (Anaphylaxis)    Intolerances          Vital Signs Last 24 Hrs  T(C): 36.9 (11 Apr 2019 08:44), Max: 36.9 (11 Apr 2019 08:44)  T(F): 98.5 (11 Apr 2019 08:44), Max: 98.5 (11 Apr 2019 08:44)  HR: 90 (11 Apr 2019 08:44) (76 - 90)  BP: 130/69 (11 Apr 2019 08:44) (97/67 - 130/69)  BP(mean): --  RR: 18 (11 Apr 2019 08:44) (18 - 18)  SpO2: 96% (11 Apr 2019 08:44) (96% - 98%)  CAPILLARY BLOOD GLUCOSE          04-10 @ 07:01  -  04-11 @ 07:00  --------------------------------------------------------  IN: 40 mL / OUT: 0 mL / NET: 40 mL        Physical Exam:  (at 11:45AM)  Daily     Daily   General:  underweight; comfortable-appearing in NAD  Abdomen:  soft NT ND  Neuro:  AAOx3    LABS:                        7.8    11.12 )-----------( 275      ( 11 Apr 2019 05:56 )             27.0     04-11    138  |  105  |  17  ----------------------------<  93  4.1   |  26  |  0.70    Ca    8.1<L>      11 Apr 2019 05:56  Mg     2.0     04-11              RADIOLOGY & ADDITIONAL TESTS:    ---------------------------------------------------------------------------  I personally reviewed: [  ]EKG   [  ]CXR    [  ] CT    [  ]Other  ---------------------------------------------------------------------------  PLEASE CHECK WHEN PRESENT:     [  ]Heart Failure     [  ] Acute     [  ] Acute on Chronic     [  ] Chronic  -------------------------------------------------------------------     [  ]Diastolic [HFpEF]     [  ]Systolic [HFrEF]     [  ]Combined [HFpEF & HFrEF]     [  ]Other:  -------------------------------------------------------------------  [  ]ZULEMA     [  ]ATN     [  ]Reneal Medullary Necrosis     [  ]Renal Cortical Necrosis     [  ]Other Pathological Lesions:    [  ]CKD 1  [  ]CKD 2  [  ]CKD 3  [  ]CKD 4  [  ]CKD 5  [  ]Other  -------------------------------------------------------------------  [  ]Other/Unspecified:    --------------------------------------------------------------------    Abdominal Nutritional Status  [  ]Malnutrition: See Nutrition Note  [  ]Cachexia  [  ]Other:   [  ]Supplement Ordered:  [  ]Morbid Obesity (BMI >=40]

## 2019-04-11 NOTE — PROGRESS NOTE ADULT - ASSESSMENT
83 year old female with pmhx of iron deficiency anemia, hemorrhoids, osteoporsis, R hip replacement LE edema, dementia who presents with BRBPR  Hematochezia  -on admission hemoglobin 7.8, down from baseline of 9 in Feb, VSS  -s/p flex sig on 4/10- rectal colitis s/p biopsy, with sigmoid pseudopolyps, high suspicion for IBD.   -Recommend hydrocortisone enemas while await pathology  -Monitor hemoglobin and transfuse if <7 83 year old female with pmhx of iron deficiency anemia, hemorrhoids, osteoporsis, R hip replacement LE edema, dementia who presents with BRBPR  Hematochezia  -on admission hemoglobin 7.8, down from baseline of 9 in Feb, VSS  -s/p flex sig on 4/10- rectal colitis s/p biopsy, with sigmoid pseudopolyps, high suspicion for IBD.   -Recommend hydrocortisone enemas while await pathology  -pt informed us today that she had a formal diagnosis of IBD many years ago but has refused treatment. she believes the treatment makes her sick. we discussed at length what the diagnosis of IBD is, the risk of untreated IBD ( bleeding, cancer) as well as the different treatment options. pt reports understanding. at this time, she does not want medications. she will do her own research and follow up with Dr. Ramirez at the IBD clinic   Gi will sign off. Please notify if any further questions or concerns 83 year old female with pmhx of iron deficiency anemia, hemorrhoids, osteoporsis, R hip replacement LE edema, dementia who presents with BRBPR  Hematochezia  -on admission hemoglobin 7.8, down from baseline of 9 in Feb, VSS  -s/p flex sig on 4/10- rectal colitis s/p biopsy, with sigmoid pseudopolyps, high suspicion for IBD.   -Recommend hydrocortisone enemas while await pathology  -pt informed us today that she had a formal diagnosis of IBD many years ago but has refused treatment. she believes the treatment makes her sick. we discussed at length what the diagnosis of IBD is, the risk of untreated IBD ( bleeding, cancer) as well as the different treatment options. pt reports understanding. at this time, she does not want medications. she will do her own research and follow up with Dr. Ramirez at the IBD clinic   GI will sign off. Please notify if any further questions or concerns

## 2019-04-11 NOTE — PROGRESS NOTE ADULT - PROBLEM SELECTOR PLAN 5
Patient with history of depression like symptoms and questionable suicidal ideations. Patient notified nurse overnigtht of thoughts of hurting herself; however when asks, says she was joking. Patient denies any plan  - psych consult - no further recs. Pt is not harmful to herself or others.   - consider SSRI

## 2019-04-11 NOTE — PROGRESS NOTE ADULT - SUBJECTIVE AND OBJECTIVE BOX
Patient is a 83y old  Female who presents with a chief complaint of BRBPR (10 Apr 2019 15:44)      INTERVAL HPI/OVERNIGHT EVENTS:  ICU Vital Signs Last 24 Hrs  T(C): 36.5 (11 Apr 2019 05:31), Max: 36.5 (11 Apr 2019 05:31)  T(F): 97.7 (11 Apr 2019 05:31), Max: 97.7 (11 Apr 2019 05:31)  HR: 78 (11 Apr 2019 05:31) (67 - 80)  BP: 108/62 (11 Apr 2019 05:31) (97/67 - 127/69)  BP(mean): --  ABP: --  ABP(mean): --  RR: 18 (11 Apr 2019 05:31) (18 - 18)  SpO2: 97% (11 Apr 2019 05:31) (96% - 98%)    I&O's Summary    10 Apr 2019 07:01  -  11 Apr 2019 07:00  --------------------------------------------------------  IN: 40 mL / OUT: 0 mL / NET: 40 mL          LABS:                        7.8    11.12 )-----------( 275      ( 11 Apr 2019 05:56 )             27.0     04-11    138  |  105  |  17  ----------------------------<  93  4.1   |  26  |  0.70    Ca    8.1<L>      11 Apr 2019 05:56  Mg     2.0     04-11    TPro  6.1  /  Alb  2.3<L>  /  TBili  0.3  /  DBili  x   /  AST  11  /  ALT  9<L>  /  AlkPhos  98  04-09        CAPILLARY BLOOD GLUCOSE            RADIOLOGY & ADDITIONAL TESTS:    Consultant(s) Notes Reviewed:  [x ] YES  [ ] NO    MEDICATIONS  (STANDING):  hydrocortisone Enema 100 milliGRAM(s) Rectal at bedtime  melatonin 5 milliGRAM(s) Oral at bedtime  multivitamin 1 Tablet(s) Oral daily    MEDICATIONS  (PRN):      PHYSICAL EXAM:  GENERAL: well built, well nourished  HEAD:  Atraumatic, Normocephalic  EYES: EOMI, PERRLA, conjunctiva and sclera clear  ENT: No tonsillar erythema, exudates, or enlargement; Moist mucous membranes, Good dentition, No lesions  NECK: Supple, No JVD, Normal thyroid, no enlarged nodes  NERVOUS SYSTEM:  Alert & Oriented X3, Good concentration; Motor Strength 5/5 B/L upper and lower extremities; DTRs 2+ intact and symmetric, sensory intact  CHEST/LUNG: B/L good air entry; No rales, rhonchi, or wheezing  HEART: S1S2 normal, no S3, Regular rate and rhythm; No murmurs, rubs, or gallops  ABDOMEN: Soft, Nontender, Nondistended; Bowel sounds present  EXTREMITIES:  2+ Peripheral Pulses, No clubbing, cyanosis, or edema  LYMPH: No lymphadenopathy noted  SKIN: No rashes or lesions    Care Discussed with Consultants/Other Providers [ x] YES  [ ] NO OVERNIGHT EVENTS: TOYA	    INTERVAL HPI: Pt is examined at bedside. She appears well in NAD. Patient says she had diarrhea with small amount of red blood. She denies any CP, SOB, abdominal pain, nausea/vomiting, fevers/chills.     ICU Vital Signs Last 24 Hrs  T(C): 36.5 (11 Apr 2019 05:31), Max: 36.5 (11 Apr 2019 05:31)  T(F): 97.7 (11 Apr 2019 05:31), Max: 97.7 (11 Apr 2019 05:31)  HR: 78 (11 Apr 2019 05:31) (67 - 80)  BP: 108/62 (11 Apr 2019 05:31) (97/67 - 127/69)  BP(mean): --  ABP: --  ABP(mean): --  RR: 18 (11 Apr 2019 05:31) (18 - 18)  SpO2: 97% (11 Apr 2019 05:31) (96% - 98%)    I&O's Summary    10 Apr 2019 07:01  -  11 Apr 2019 07:00  --------------------------------------------------------  IN: 40 mL / OUT: 0 mL / NET: 40 mL          LABS:                        7.8    11.12 )-----------( 275      ( 11 Apr 2019 05:56 )             27.0     04-11    138  |  105  |  17  ----------------------------<  93  4.1   |  26  |  0.70    Ca    8.1<L>      11 Apr 2019 05:56  Mg     2.0     04-11    TPro  6.1  /  Alb  2.3<L>  /  TBili  0.3  /  DBili  x   /  AST  11  /  ALT  9<L>  /  AlkPhos  98  04-09        CAPILLARY BLOOD GLUCOSE            RADIOLOGY & ADDITIONAL TESTS:    Consultant(s) Notes Reviewed:  [x ] YES  [ ] NO    MEDICATIONS  (STANDING):  hydrocortisone Enema 100 milliGRAM(s) Rectal at bedtime  melatonin 5 milliGRAM(s) Oral at bedtime  multivitamin 1 Tablet(s) Oral daily    MEDICATIONS  (PRN):    PHYSICAL EXAM:  GENERAL: Elderly female in NAD.   HEAD:  Atraumatic, Normocephalic  EYES: EOMI, PERRLA, conjunctiva and sclera clear  NECK: Supple, No JVD, Normal thyroid, no enlarged nodes  NERVOUS SYSTEM:  Alert & Oriented X3,  Motor Strength 5/5 B/L upper and lower extremities; DTRs 2+ intact and symmetric, sensory intact  CHEST/LUNG: B/L good air entry; No rales, rhonchi, or wheezing  HEART: S1S2 normal, no S3, Regular rate and rhythm; No murmurs, rubs, or gallops  ABDOMEN: Soft, Nontender, Nondistended; Bowel sounds present  EXTREMITIES:  2+ pitting edema   LYMPH: No lymphadenopathy noted  SKIN: No rashes or lesions    Care Discussed with Consultants/Other Providers [ x] YES  [ ] NO

## 2019-04-11 NOTE — PROGRESS NOTE ADULT - PROBLEM SELECTOR PLAN 4
Pt extremely thin, however endorses good po intake. Patient says she has a particular diet and eats every 2 hours. albumin 2.9   - Nutrition and dietician consult    #hypoalbuminemia   likely in setting of poor po intake   Will continue to trend   Plan as above    #leukocytosis likely 2/2 dehydration as patient with no signs of infection. Pt with no fever, HDS, no cough, or urinary sx. Likely dehydration as sx improved with fluids. - trend CBC  - likely 2/2 IBD flare   - continue to trend

## 2019-04-11 NOTE — PROGRESS NOTE ADULT - PROBLEM SELECTOR PLAN 1
The patient is presenting with BRBPR for the past few days at home. She denies any SOB, dizziness, lightheadedness. Pt has hx of DONAVAN, prior hgb was 9. Rectal exam normal, no hemroids or bleeding. FOBT postitive.   - Results of sigmoidoscopy: Findings of possible IBD.   - hydrocortisone enema   - monitor CBC at this time, no lonnie bleeding currently and HDS   - active type and screen   - GI consult, f/uop recs   - monitor vitals per RMF The patient is presenting with BRBPR for the past few days at home. She denies any SOB, dizziness, lightheadedness. Pt has hx of DONAVAN, prior hgb was 9. Rectal exam normal, no hemroids or bleeding. FOBT postitive.   - Results of sigmoidoscopy: Findings of possible IBD.   - Pt is refusing hydrocortisone enema as she does not think she has IBD and diarhea is related to her diet. Patient explained symptoms are likely not from diet and results of sigmoidoscopy shown. She understands risks on not taking medication including worsening symptoms and bleeding.   - monitor CBC at this time, no lonnie bleeding currently and HDS   - active type and screen   - GI consult, f/uop recs   - monitor vitals per RMF

## 2019-04-11 NOTE — PROGRESS NOTE ADULT - SUBJECTIVE AND OBJECTIVE BOX
Pt seen and examined at bedside.    PERTINENT REVIEW OF SYSTEMS:  CONSTITUTIONAL: No weakness, fevers or chills  HEENT: No visual changes; No vertigo or throat pain   GASTROINTESTINAL: No abdominal or epigastric pain. No nausea, vomiting, or hematemesis; No diarrhea or constipation. No melena or hematochezia.  NEUROLOGICAL: No numbness or weakness  SKIN: No itching, burning, rashes, or lesions     Allergies    Cipro (Anaphylaxis)  Gluten (Anaphylaxis)  lactose (Anaphylaxis)  Nuts (Anaphylaxis)    Intolerances      MEDICATIONS:  MEDICATIONS  (STANDING):  hydrocortisone Enema 100 milliGRAM(s) Rectal at bedtime  melatonin 5 milliGRAM(s) Oral at bedtime  multivitamin 1 Tablet(s) Oral daily    MEDICATIONS  (PRN):    Vital Signs Last 24 Hrs  T(C): 36.5 (11 Apr 2019 05:31), Max: 36.5 (11 Apr 2019 05:31)  T(F): 97.7 (11 Apr 2019 05:31), Max: 97.7 (11 Apr 2019 05:31)  HR: 78 (11 Apr 2019 05:31) (67 - 80)  BP: 108/62 (11 Apr 2019 05:31) (97/67 - 127/69)  BP(mean): --  RR: 18 (11 Apr 2019 05:31) (18 - 18)  SpO2: 97% (11 Apr 2019 05:31) (96% - 98%)    04-10 @ 07:01  -  04-11 @ 07:00  --------------------------------------------------------  IN: 40 mL / OUT: 0 mL / NET: 40 mL      PHYSICAL EXAM:    General: Well developed; well nourished; in no acute distress  HEENT: MMM, conjunctiva and sclera clear  Gastrointestinal: Soft non-tender non-distended; Normal bowel sounds; No hepatosplenomegaly. No rebound or guarding  Skin: Warm and dry. No obvious rash    LABS:                        7.8    11.12 )-----------( 275      ( 11 Apr 2019 05:56 )             27.0     04-11    138  |  105  |  17  ----------------------------<  93  4.1   |  26  |  0.70    Ca    8.1<L>      11 Apr 2019 05:56  Mg     2.0     04-11                        RADIOLOGY & ADDITIONAL STUDIES: Pt seen and examined at bedside. Pt denies abd pain, nausea, vomiting. Had diarrhea last night, no blood    PERTINENT REVIEW OF SYSTEMS:  CONSTITUTIONAL: No weakness, fevers or chills  HEENT: No visual changes; No vertigo or throat pain   GASTROINTESTINAL: No abdominal or epigastric pain. No nausea, vomiting, or hematemesis; No diarrhea or constipation. No melena or hematochezia.  NEUROLOGICAL: No numbness or weakness  SKIN: No itching, burning, rashes, or lesions     Allergies    Cipro (Anaphylaxis)  Gluten (Anaphylaxis)  lactose (Anaphylaxis)  Nuts (Anaphylaxis)    Intolerances      MEDICATIONS:  MEDICATIONS  (STANDING):  hydrocortisone Enema 100 milliGRAM(s) Rectal at bedtime  melatonin 5 milliGRAM(s) Oral at bedtime  multivitamin 1 Tablet(s) Oral daily    MEDICATIONS  (PRN):    Vital Signs Last 24 Hrs  T(C): 36.5 (11 Apr 2019 05:31), Max: 36.5 (11 Apr 2019 05:31)  T(F): 97.7 (11 Apr 2019 05:31), Max: 97.7 (11 Apr 2019 05:31)  HR: 78 (11 Apr 2019 05:31) (67 - 80)  BP: 108/62 (11 Apr 2019 05:31) (97/67 - 127/69)  BP(mean): --  RR: 18 (11 Apr 2019 05:31) (18 - 18)  SpO2: 97% (11 Apr 2019 05:31) (96% - 98%)    04-10 @ 07:01  -  04-11 @ 07:00  --------------------------------------------------------  IN: 40 mL / OUT: 0 mL / NET: 40 mL      PHYSICAL EXAM:    General: Well developed; well nourished; in no acute distress  HEENT: MMM, conjunctiva and sclera clear  Gastrointestinal: Soft non-tender non-distended; Normal bowel sounds; No hepatosplenomegaly. No rebound or guarding  Skin: Warm and dry. No obvious rash    LABS:                        7.8    11.12 )-----------( 275      ( 11 Apr 2019 05:56 )             27.0     04-11    138  |  105  |  17  ----------------------------<  93  4.1   |  26  |  0.70    Ca    8.1<L>      11 Apr 2019 05:56  Mg     2.0     04-11                        RADIOLOGY & ADDITIONAL STUDIES:

## 2019-04-11 NOTE — PROGRESS NOTE ADULT - PROBLEM SELECTOR PLAN 9
1) PCP Contacted on Admission: (Y/N) --> Name & Phone #: Dr. Al  2) Date of Contact with PCP:  3) PCP Contacted at Discharge: (Y/N, N/A)  4) Summary of Handoff Given to PCP:   5) Post-Discharge Appointment Date and Location:

## 2019-04-11 NOTE — PROGRESS NOTE ADULT - PROBLEM SELECTOR PLAN 1
d/t acute on chronic lower GI bleed, d/t hemorrhoids and possible IBD colitis, as seen on flex sig 4/10, path reports pending; monitor CBC, keep Hb > 7; Pt. takes iron supplements at home; Pt. offered but refuses hydrocortisone enemas; f/u GI recs

## 2019-04-11 NOTE — PROGRESS NOTE ADULT - PROBLEM SELECTOR PLAN 6
Pt with dementia, 0/3 word recall, tangential thought and cannot remember what happened when. More likely related to old age however will rule out other causes.   - f/u b12, folate, tsh- wnl

## 2019-04-12 VITALS
SYSTOLIC BLOOD PRESSURE: 132 MMHG | RESPIRATION RATE: 18 BRPM | TEMPERATURE: 98 F | HEART RATE: 87 BPM | DIASTOLIC BLOOD PRESSURE: 67 MMHG | OXYGEN SATURATION: 96 %

## 2019-04-12 LAB
ANION GAP SERPL CALC-SCNC: 10 MMOL/L — SIGNIFICANT CHANGE UP (ref 5–17)
BUN SERPL-MCNC: 18 MG/DL — SIGNIFICANT CHANGE UP (ref 7–23)
CALCIUM SERPL-MCNC: 8.2 MG/DL — LOW (ref 8.4–10.5)
CHLORIDE SERPL-SCNC: 105 MMOL/L — SIGNIFICANT CHANGE UP (ref 96–108)
CO2 SERPL-SCNC: 24 MMOL/L — SIGNIFICANT CHANGE UP (ref 22–31)
CREAT SERPL-MCNC: 0.6 MG/DL — SIGNIFICANT CHANGE UP (ref 0.5–1.3)
GLUCOSE SERPL-MCNC: 95 MG/DL — SIGNIFICANT CHANGE UP (ref 70–99)
HCT VFR BLD CALC: 26.1 % — LOW (ref 34.5–45)
HGB BLD-MCNC: 7.6 G/DL — LOW (ref 11.5–15.5)
MAGNESIUM SERPL-MCNC: 2 MG/DL — SIGNIFICANT CHANGE UP (ref 1.6–2.6)
MCHC RBC-ENTMCNC: 25.9 PG — LOW (ref 27–34)
MCHC RBC-ENTMCNC: 29.1 GM/DL — LOW (ref 32–36)
MCV RBC AUTO: 89.1 FL — SIGNIFICANT CHANGE UP (ref 80–100)
NRBC # BLD: 0 /100 WBCS — SIGNIFICANT CHANGE UP (ref 0–0)
PLATELET # BLD AUTO: 280 K/UL — SIGNIFICANT CHANGE UP (ref 150–400)
POTASSIUM SERPL-MCNC: 4 MMOL/L — SIGNIFICANT CHANGE UP (ref 3.5–5.3)
POTASSIUM SERPL-SCNC: 4 MMOL/L — SIGNIFICANT CHANGE UP (ref 3.5–5.3)
RBC # BLD: 2.93 M/UL — LOW (ref 3.8–5.2)
RBC # FLD: 16.9 % — HIGH (ref 10.3–14.5)
SODIUM SERPL-SCNC: 139 MMOL/L — SIGNIFICANT CHANGE UP (ref 135–145)
SURGICAL PATHOLOGY STUDY: SIGNIFICANT CHANGE UP
WBC # BLD: 11.98 K/UL — HIGH (ref 3.8–10.5)
WBC # FLD AUTO: 11.98 K/UL — HIGH (ref 3.8–10.5)

## 2019-04-12 PROCEDURE — 86900 BLOOD TYPING SEROLOGIC ABO: CPT

## 2019-04-12 PROCEDURE — 82803 BLOOD GASES ANY COMBINATION: CPT

## 2019-04-12 PROCEDURE — 84132 ASSAY OF SERUM POTASSIUM: CPT

## 2019-04-12 PROCEDURE — 82272 OCCULT BLD FECES 1-3 TESTS: CPT

## 2019-04-12 PROCEDURE — 88305 TISSUE EXAM BY PATHOLOGIST: CPT

## 2019-04-12 PROCEDURE — 82330 ASSAY OF CALCIUM: CPT

## 2019-04-12 PROCEDURE — 96374 THER/PROPH/DIAG INJ IV PUSH: CPT

## 2019-04-12 PROCEDURE — 85730 THROMBOPLASTIN TIME PARTIAL: CPT

## 2019-04-12 PROCEDURE — 86850 RBC ANTIBODY SCREEN: CPT

## 2019-04-12 PROCEDURE — 99285 EMERGENCY DEPT VISIT HI MDM: CPT | Mod: 25

## 2019-04-12 PROCEDURE — 36415 COLL VENOUS BLD VENIPUNCTURE: CPT

## 2019-04-12 PROCEDURE — 85027 COMPLETE CBC AUTOMATED: CPT

## 2019-04-12 PROCEDURE — 84443 ASSAY THYROID STIM HORMONE: CPT

## 2019-04-12 PROCEDURE — 80048 BASIC METABOLIC PNL TOTAL CA: CPT

## 2019-04-12 PROCEDURE — 86901 BLOOD TYPING SEROLOGIC RH(D): CPT

## 2019-04-12 PROCEDURE — 82607 VITAMIN B-12: CPT

## 2019-04-12 PROCEDURE — 85610 PROTHROMBIN TIME: CPT

## 2019-04-12 PROCEDURE — 84295 ASSAY OF SERUM SODIUM: CPT

## 2019-04-12 PROCEDURE — 97116 GAIT TRAINING THERAPY: CPT

## 2019-04-12 PROCEDURE — C9113: CPT

## 2019-04-12 PROCEDURE — 97161 PT EVAL LOW COMPLEX 20 MIN: CPT

## 2019-04-12 PROCEDURE — 80053 COMPREHEN METABOLIC PANEL: CPT

## 2019-04-12 PROCEDURE — 99238 HOSP IP/OBS DSCHRG MGMT 30/<: CPT

## 2019-04-12 PROCEDURE — 83735 ASSAY OF MAGNESIUM: CPT

## 2019-04-12 PROCEDURE — 81001 URINALYSIS AUTO W/SCOPE: CPT

## 2019-04-12 PROCEDURE — 85045 AUTOMATED RETICULOCYTE COUNT: CPT

## 2019-04-12 PROCEDURE — 83615 LACTATE (LD) (LDH) ENZYME: CPT

## 2019-04-12 PROCEDURE — 83010 ASSAY OF HAPTOGLOBIN QUANT: CPT

## 2019-04-12 PROCEDURE — 82746 ASSAY OF FOLIC ACID SERUM: CPT

## 2019-04-12 PROCEDURE — 83690 ASSAY OF LIPASE: CPT

## 2019-04-12 PROCEDURE — 85025 COMPLETE CBC W/AUTO DIFF WBC: CPT

## 2019-04-12 PROCEDURE — 80076 HEPATIC FUNCTION PANEL: CPT

## 2019-04-12 RX ADMIN — Medication 1 TABLET(S): at 12:50

## 2019-04-12 NOTE — PROGRESS NOTE ADULT - SUBJECTIVE AND OBJECTIVE BOX
Patient is a 83y old  Female who presents with a chief complaint of BRBPR (11 Apr 2019 13:21)      INTERVAL HPI/OVERNIGHT EVENTS:    Pt. has no acute complaints  HHA at bedside; Pt. ambulating w/ cane  Pt. refusing hydrocortisone enema; Pt. verbalized understanding of its benefits and risks of refusal  Pt. verbalized understanding of importance of outpatient GI f/u    Review of Systems: 12 point review of systems otherwise negative    MEDICATIONS  (STANDING):  hydrocortisone Enema 100 milliGRAM(s) Rectal at bedtime  melatonin 5 milliGRAM(s) Oral at bedtime  multivitamin 1 Tablet(s) Oral daily    MEDICATIONS  (PRN):      Allergies    Cipro (Anaphylaxis)  Gluten (Anaphylaxis)  lactose (Anaphylaxis)  Nuts (Anaphylaxis)    Intolerances          Vital Signs Last 24 Hrs  T(C): 36.9 (12 Apr 2019 09:01), Max: 36.9 (12 Apr 2019 09:01)  T(F): 98.5 (12 Apr 2019 09:01), Max: 98.5 (12 Apr 2019 09:01)  HR: 87 (12 Apr 2019 09:01) (82 - 87)  BP: 132/67 (12 Apr 2019 09:01) (102/65 - 132/67)  BP(mean): --  RR: 18 (12 Apr 2019 09:01) (18 - 18)  SpO2: 96% (12 Apr 2019 09:01) (95% - 96%)  CAPILLARY BLOOD GLUCOSE          04-11 @ 07:01  -  04-12 @ 07:00  --------------------------------------------------------  IN: 0 mL / OUT: 1 mL / NET: -1 mL        Physical Exam:    Daily     Daily   General:  underweight; ambulating w/ cane, in NAD  HEENT:  MMM, no pallor  Skin:  WWP  Neuro:  AAOx3, forgetful    LABS:                        7.6    11.98 )-----------( 280      ( 12 Apr 2019 06:27 )             26.1     04-12    139  |  105  |  18  ----------------------------<  95  4.0   |  24  |  0.60    Ca    8.2<L>      12 Apr 2019 06:27  Mg     2.0     04-12              RADIOLOGY & ADDITIONAL TESTS:    ---------------------------------------------------------------------------  I personally reviewed: [  ]EKG   [  ]CXR    [  ] CT    [  ]Other  ---------------------------------------------------------------------------  PLEASE CHECK WHEN PRESENT:     [  ]Heart Failure     [  ] Acute     [  ] Acute on Chronic     [  ] Chronic  -------------------------------------------------------------------     [  ]Diastolic [HFpEF]     [  ]Systolic [HFrEF]     [  ]Combined [HFpEF & HFrEF]     [  ]Other:  -------------------------------------------------------------------  [  ]ZULEMA     [  ]ATN     [  ]Reneal Medullary Necrosis     [  ]Renal Cortical Necrosis     [  ]Other Pathological Lesions:    [  ]CKD 1  [  ]CKD 2  [  ]CKD 3  [  ]CKD 4  [  ]CKD 5  [  ]Other  -------------------------------------------------------------------  [  ]Other/Unspecified:    --------------------------------------------------------------------    Abdominal Nutritional Status  [  ]Malnutrition: See Nutrition Note  [  ]Cachexia  [  ]Other:   [  ]Supplement Ordered:  [  ]Morbid Obesity (BMI >=40]

## 2019-04-12 NOTE — PROGRESS NOTE ADULT - PROBLEM SELECTOR PLAN 2
Pt. w/ possible early dementia as well; provide frequent re-orientation, assist w/ ADLs; appreciate Psych recs; HHA services

## 2019-04-12 NOTE — PROGRESS NOTE ADULT - PROBLEM SELECTOR PROBLEM 1
Anemia due to blood loss
BRBPR (bright red blood per rectum)

## 2019-04-12 NOTE — PROGRESS NOTE ADULT - ATTENDING COMMENTS
The importance of therapy for active IBD discussed at length. The risk of adverse outcomes including cancer, surgery and ongoing symptoms, anemia discussed. Pt verbalized understanding. At this time, she continues to refuse therapy. She will decide if she wishes to f/u with primary GI or with Dr Fernandes in office.
Dispo: flex sig tomorrow
Dispo: d/c home today w/ outpatient GI f/u, HHA, and HPT
Dispo: d/c home w/ outpatient GI f/u, HHA
Dispo: flex sig today

## 2019-04-12 NOTE — PROGRESS NOTE ADULT - PROVIDER SPECIALTY LIST ADULT
Gastroenterology
Hospitalist
Internal Medicine
Internal Medicine
Rehab Medicine
Internal Medicine

## 2019-04-17 DIAGNOSIS — F03.90 UNSPECIFIED DEMENTIA WITHOUT BEHAVIORAL DISTURBANCE: ICD-10-CM

## 2019-04-17 DIAGNOSIS — K60.2 ANAL FISSURE, UNSPECIFIED: ICD-10-CM

## 2019-04-17 DIAGNOSIS — D50.9 IRON DEFICIENCY ANEMIA, UNSPECIFIED: ICD-10-CM

## 2019-04-17 DIAGNOSIS — K62.89 OTHER SPECIFIED DISEASES OF ANUS AND RECTUM: ICD-10-CM

## 2019-04-17 DIAGNOSIS — K52.9 NONINFECTIVE GASTROENTERITIS AND COLITIS, UNSPECIFIED: ICD-10-CM

## 2019-04-17 DIAGNOSIS — M81.0 AGE-RELATED OSTEOPOROSIS WITHOUT CURRENT PATHOLOGICAL FRACTURE: ICD-10-CM

## 2019-04-17 DIAGNOSIS — D64.9 ANEMIA, UNSPECIFIED: ICD-10-CM

## 2019-04-17 DIAGNOSIS — D72.829 ELEVATED WHITE BLOOD CELL COUNT, UNSPECIFIED: ICD-10-CM

## 2019-04-17 DIAGNOSIS — E86.0 DEHYDRATION: ICD-10-CM

## 2019-04-17 DIAGNOSIS — Z91.018 ALLERGY TO OTHER FOODS: ICD-10-CM

## 2019-04-17 DIAGNOSIS — K92.2 GASTROINTESTINAL HEMORRHAGE, UNSPECIFIED: ICD-10-CM

## 2019-04-17 DIAGNOSIS — Z96.641 PRESENCE OF RIGHT ARTIFICIAL HIP JOINT: ICD-10-CM

## 2019-04-17 DIAGNOSIS — Z91.011 ALLERGY TO MILK PRODUCTS: ICD-10-CM

## 2019-04-17 DIAGNOSIS — F32.9 MAJOR DEPRESSIVE DISORDER, SINGLE EPISODE, UNSPECIFIED: ICD-10-CM

## 2019-04-17 DIAGNOSIS — E43 UNSPECIFIED SEVERE PROTEIN-CALORIE MALNUTRITION: ICD-10-CM

## 2019-04-18 ENCOUNTER — APPOINTMENT (OUTPATIENT)
Dept: GASTROENTEROLOGY | Facility: CLINIC | Age: 84
End: 2019-04-18
Payer: MEDICARE

## 2019-04-18 VITALS
RESPIRATION RATE: 16 BRPM | HEIGHT: 60 IN | DIASTOLIC BLOOD PRESSURE: 70 MMHG | HEART RATE: 90 BPM | OXYGEN SATURATION: 98 % | WEIGHT: 94 LBS | SYSTOLIC BLOOD PRESSURE: 110 MMHG | BODY MASS INDEX: 18.46 KG/M2

## 2019-04-18 DIAGNOSIS — K51.90 ULCERATIVE COLITIS, UNSPECIFIED, W/OUT COMPLICATIONS: ICD-10-CM

## 2019-04-18 PROCEDURE — 99213 OFFICE O/P EST LOW 20 MIN: CPT

## 2019-04-24 ENCOUNTER — APPOINTMENT (OUTPATIENT)
Dept: HEART AND VASCULAR | Facility: CLINIC | Age: 84
End: 2019-04-24

## 2019-04-26 ENCOUNTER — APPOINTMENT (OUTPATIENT)
Dept: GASTROENTEROLOGY | Facility: CLINIC | Age: 84
End: 2019-04-26

## 2019-04-29 ENCOUNTER — APPOINTMENT (OUTPATIENT)
Dept: FAMILY MEDICINE | Facility: CLINIC | Age: 84
End: 2019-04-29

## 2019-05-15 ENCOUNTER — MOBILE ON CALL (OUTPATIENT)
Age: 84
End: 2019-05-15

## 2019-08-07 ENCOUNTER — MOBILE ON CALL (OUTPATIENT)
Age: 84
End: 2019-08-07

## 2019-11-12 ENCOUNTER — INPATIENT (INPATIENT)
Facility: HOSPITAL | Age: 84
LOS: 1 days | Discharge: HOME HEALTH | End: 2019-11-14
Attending: NEUROLOGICAL SURGERY | Admitting: NEUROLOGICAL SURGERY
Payer: MEDICARE

## 2019-11-12 VITALS
SYSTOLIC BLOOD PRESSURE: 122 MMHG | HEART RATE: 97 BPM | DIASTOLIC BLOOD PRESSURE: 61 MMHG | TEMPERATURE: 98 F | OXYGEN SATURATION: 96 % | RESPIRATION RATE: 15 BRPM

## 2019-11-12 DIAGNOSIS — Z96.641 PRESENCE OF RIGHT ARTIFICIAL HIP JOINT: Chronic | ICD-10-CM

## 2019-11-12 PROCEDURE — 70450 CT HEAD/BRAIN W/O DYE: CPT | Mod: 26

## 2019-11-12 PROCEDURE — 72125 CT NECK SPINE W/O DYE: CPT | Mod: 26

## 2019-11-12 PROCEDURE — 73502 X-RAY EXAM HIP UNI 2-3 VIEWS: CPT | Mod: 26,RT

## 2019-11-12 NOTE — ED PROVIDER NOTE - CARE PLAN
Principal Discharge DX:	Compression fracture of C5 vertebra  Secondary Diagnosis:	Compression fracture of T1 vertebra

## 2019-11-12 NOTE — ED PROVIDER NOTE - PHYSICAL EXAMINATION
Teagan Jones, .:   GENERAL: Patient awake alert NAD.  HEENT: NC/AT, Moist mucous membranes, PERRL, EOMI.  LUNGS: CTAB, no wheezes or crackles.   CARDIAC: RRR, no m/r/g.    ABDOMEN: Soft, NT, ND, No rebound, guarding.  EXT: No edema. No calf tenderness.  MSK: No spinal tenderness, no pain with movement, no deformities.  NEURO: A&Ox2. Moving all extremities. No focal deficits.  SKIN: Warm and dry. No rash.  PSYCH: N/A

## 2019-11-12 NOTE — ED PROVIDER NOTE - OBJECTIVE STATEMENT
84F pmhx of Asthma, Paranoia and dementia presenting from Assisted living facility for witnessed fall, hitting her head. She remembers the event, denies LOC. Patient seems paranoid about her health aid, stating that she dislikes her living situation. Denies any headache, dizziness. Admits to Right hip pain and lower back pain, but states that is has been chronic. No other complaints.

## 2019-11-12 NOTE — ED ADULT TRIAGE NOTE - CHIEF COMPLAINT QUOTE
alert oriented x 2 from [place] in [facility] confused fell and hit back of head on a wall   has lump   no LOC   c/o generalized pain   hx dementia

## 2019-11-12 NOTE — ED ADULT NURSE NOTE - NSIMPLEMENTINTERV_GEN_ALL_ED
Implemented All Fall Risk Interventions:  [place] to call system. Call bell, personal items and telephone within reach. Instruct patient to call for assistance. Room bathroom lighting operational. Non-slip footwear when patient is off stretcher. Physically safe environment: no spills, clutter or unnecessary equipment. Stretcher in lowest position, wheels locked, appropriate side rails in place. Provide visual cue, wrist band, yellow gown, etc. Monitor gait and stability. Monitor for mental status changes and reorient to person, place, and time. Review medications for side effects contributing to fall risk. Reinforce activity limits and safety measures with patient and family.

## 2019-11-12 NOTE — ED PROVIDER NOTE - ATTENDING CONTRIBUTION TO CARE
84 year old with fall at snf.  concern for spinal fracture and leg injury.  will ct spine image legs. pain control. reassess

## 2019-11-12 NOTE — ED PROVIDER NOTE - NS ED ROS FT
CONST: no fevers, no chills  EYES: no pain, no vision changes  ENT: no sore throat, no ear pain, no change in hearing  CV: no chest pain, no leg swelling  RESP: no shortness of breath, no cough  ABD: no abdominal pain, no nausea, no vomiting, no diarrhea  : no dysuria, no flank pain, no hematuria  MSK: +back pain, +R hip pain  NEURO: no headache or additional neurologic complaints  HEME: no easy bleeding  SKIN:  no rash

## 2019-11-12 NOTE — ED ADULT NURSE NOTE - CHIEF COMPLAINT QUOTE
alert oriented x 2 from [place] in [facility] confused fell and hit back of head on a wall   has lump   no LOC   c/o generalized pain   hx dementia

## 2019-11-12 NOTE — ED PROVIDER NOTE - CLINICAL SUMMARY MEDICAL DECISION MAKING FREE TEXT BOX
84F p/w fall and head injury, not on AC. No focal deficits, will get CTH and c-spine with hip xray. No signs of infection. Danilo oakes back to the facility.

## 2019-11-12 NOTE — ED ADULT TRIAGE NOTE - TEMPERATURE IN FAHRENHEIT (DEGREES F)
Subjective:      Patient ID: Shad Romero is a 61 y.o. female. HPI     Lorycoreen Alcazar presents today for evaluation of a nasal lesion but has many other complaints    For the past month or so she has had a nasal lesion on the inside of her right nostril that has been swelling, reports that she has acne. Unsure if it has had any drainage as she said \"it might just be boogers. \" It is painful to the touch. She complains of bilateral foot pain. States that she used to see a podiatrist who recommended she do physical therapy but she was unable to keep up with the copays at that time. She currently is caring for her mother who has Alzheimers and her two sons just lost their jobs and also moved home. She is very overwhelmed with how much she is doing at the moment and has trouble remembering things herself. She forgot to take her ADHD medication today and is having a very hard time focusing. Complains of passing a lot of smelly gas. Also complains of painful ulcers in her mouth that make it hard to wear her dentures. Also complains of painful corners of the mouth. Review of Systems  As noted in HPI otherwise a 10 point ROS was negative     Objective:   Physical Exam   Constitutional: She is oriented to person, place, and time. She appears well-developed and well-nourished. No distress. HENT:   Head: Normocephalic and atraumatic. Right side of nose appears swollen, lesion seen inside nare. No drainage or erythema  Apthous ulcers throoughout oral cavity, angular cheilitis noted   Eyes: Conjunctivae are normal.   Cardiovascular: Normal rate. Pulmonary/Chest: Effort normal.   Neurological: She is alert and oriented to person, place, and time. Skin: Skin is warm and dry. Psychiatric:   Hyperactive, unable to focus on one topic for very long, tearful   Vitals reviewed. Assessment:      1. Nasal lesion    2. Bilateral foot pain    3. Stress at home    4. Passing flatus    5.  Recurrent oral ulcers 98.1

## 2019-11-12 NOTE — ED ADULT NURSE NOTE - OBJECTIVE STATEMENT
pt received alert and oriented x2(confused). pt was sent from assisted living s/p fall. py states she recall the fall and hitting her head and having back pain . respirations equal and unlabored. abd soft and non tender. skin dry and intact. pt declines chest pain ,sob, dizziness, n/v/d,fever,chills. Call bell in reach, warm blanket provided, bed in lowest position, side rails up x2,safety maintained. will continue to monitor.

## 2019-11-13 ENCOUNTER — MOBILE ON CALL (OUTPATIENT)
Age: 84
End: 2019-11-13

## 2019-11-13 DIAGNOSIS — J45.909 UNSPECIFIED ASTHMA, UNCOMPLICATED: ICD-10-CM

## 2019-11-13 DIAGNOSIS — S22.010A WEDGE COMPRESSION FRACTURE OF FIRST THORACIC VERTEBRA, INITIAL ENCOUNTER FOR CLOSED FRACTURE: ICD-10-CM

## 2019-11-13 DIAGNOSIS — S12.490A OTHER DISPLACED FRACTURE OF FIFTH CERVICAL VERTEBRA, INITIAL ENCOUNTER FOR CLOSED FRACTURE: ICD-10-CM

## 2019-11-13 DIAGNOSIS — G30.9 ALZHEIMER'S DISEASE, UNSPECIFIED: ICD-10-CM

## 2019-11-13 LAB
ALBUMIN SERPL ELPH-MCNC: 2.4 G/DL — LOW (ref 3.3–5)
ALP SERPL-CCNC: 111 U/L — SIGNIFICANT CHANGE UP (ref 40–120)
ALT FLD-CCNC: 11 U/L — SIGNIFICANT CHANGE UP (ref 4–33)
ANION GAP SERPL CALC-SCNC: 11 MMO/L — SIGNIFICANT CHANGE UP (ref 7–14)
APTT BLD: 33 SEC — SIGNIFICANT CHANGE UP (ref 27.5–36.3)
AST SERPL-CCNC: 15 U/L — SIGNIFICANT CHANGE UP (ref 4–32)
BILIRUB SERPL-MCNC: 0.3 MG/DL — SIGNIFICANT CHANGE UP (ref 0.2–1.2)
BUN SERPL-MCNC: 15 MG/DL — SIGNIFICANT CHANGE UP (ref 7–23)
C DIFF TOX GENS STL QL NAA+PROBE: SIGNIFICANT CHANGE UP
CALCIUM SERPL-MCNC: 7.6 MG/DL — LOW (ref 8.4–10.5)
CHLORIDE SERPL-SCNC: 101 MMOL/L — SIGNIFICANT CHANGE UP (ref 98–107)
CO2 SERPL-SCNC: 24 MMOL/L — SIGNIFICANT CHANGE UP (ref 22–31)
CREAT SERPL-MCNC: 0.4 MG/DL — LOW (ref 0.5–1.3)
GLUCOSE SERPL-MCNC: 105 MG/DL — HIGH (ref 70–99)
HCT VFR BLD CALC: 28.8 % — LOW (ref 34.5–45)
HGB BLD-MCNC: 8.7 G/DL — LOW (ref 11.5–15.5)
INR BLD: 1.08 — SIGNIFICANT CHANGE UP (ref 0.88–1.17)
MCHC RBC-ENTMCNC: 28.1 PG — SIGNIFICANT CHANGE UP (ref 27–34)
MCHC RBC-ENTMCNC: 30.2 % — LOW (ref 32–36)
MCV RBC AUTO: 92.9 FL — SIGNIFICANT CHANGE UP (ref 80–100)
NRBC # FLD: 0 K/UL — SIGNIFICANT CHANGE UP (ref 0–0)
PLATELET # BLD AUTO: 112 K/UL — LOW (ref 150–400)
PMV BLD: 10.8 FL — SIGNIFICANT CHANGE UP (ref 7–13)
POTASSIUM SERPL-MCNC: 3.9 MMOL/L — SIGNIFICANT CHANGE UP (ref 3.5–5.3)
POTASSIUM SERPL-SCNC: 3.9 MMOL/L — SIGNIFICANT CHANGE UP (ref 3.5–5.3)
PROT SERPL-MCNC: 5.4 G/DL — LOW (ref 6–8.3)
PROTHROM AB SERPL-ACNC: 12 SEC — SIGNIFICANT CHANGE UP (ref 9.8–13.1)
RBC # BLD: 3.1 M/UL — LOW (ref 3.8–5.2)
RBC # FLD: 18 % — HIGH (ref 10.3–14.5)
SODIUM SERPL-SCNC: 136 MMOL/L — SIGNIFICANT CHANGE UP (ref 135–145)
WBC # BLD: 10.66 K/UL — HIGH (ref 3.8–10.5)
WBC # FLD AUTO: 10.66 K/UL — HIGH (ref 3.8–10.5)

## 2019-11-13 PROCEDURE — 99221 1ST HOSP IP/OBS SF/LOW 40: CPT

## 2019-11-13 RX ORDER — ACETAMINOPHEN 500 MG
650 TABLET ORAL EVERY 6 HOURS
Refills: 0 | Status: DISCONTINUED | OUTPATIENT
Start: 2019-11-13 | End: 2019-11-14

## 2019-11-13 RX ORDER — HEPARIN SODIUM 5000 [USP'U]/ML
5000 INJECTION INTRAVENOUS; SUBCUTANEOUS EVERY 12 HOURS
Refills: 0 | Status: DISCONTINUED | OUTPATIENT
Start: 2019-11-13 | End: 2019-11-14

## 2019-11-13 RX ORDER — ALBUTEROL 90 UG/1
2 AEROSOL, METERED ORAL EVERY 6 HOURS
Refills: 0 | Status: DISCONTINUED | OUTPATIENT
Start: 2019-11-13 | End: 2019-11-14

## 2019-11-13 RX ORDER — SODIUM CHLORIDE 9 MG/ML
3 INJECTION INTRAMUSCULAR; INTRAVENOUS; SUBCUTANEOUS EVERY 8 HOURS
Refills: 0 | Status: DISCONTINUED | OUTPATIENT
Start: 2019-11-13 | End: 2019-11-14

## 2019-11-13 RX ADMIN — SODIUM CHLORIDE 3 MILLILITER(S): 9 INJECTION INTRAMUSCULAR; INTRAVENOUS; SUBCUTANEOUS at 06:02

## 2019-11-13 RX ADMIN — SODIUM CHLORIDE 3 MILLILITER(S): 9 INJECTION INTRAMUSCULAR; INTRAVENOUS; SUBCUTANEOUS at 13:33

## 2019-11-13 RX ADMIN — SODIUM CHLORIDE 3 MILLILITER(S): 9 INJECTION INTRAMUSCULAR; INTRAVENOUS; SUBCUTANEOUS at 21:20

## 2019-11-13 NOTE — OCCUPATIONAL THERAPY INITIAL EVALUATION ADULT - DIAGNOSIS, OT EVAL
s/p fall; Acute appearing compression fracture of T1; Decreased sitting and standing balance; Decreased functional mobility; Decreased ADL management

## 2019-11-13 NOTE — H&P ADULT - ATTENDING COMMENTS
Agree with above. Patient having difficult time tolerating MRI machine and will require sedation for scan. All disc disease on CT appears to be c/w chronic disease. Recommend:  - MRI as outpatient when able to tolerate  - collar prn comfort  - f/u in outpatient [facility] clinic

## 2019-11-13 NOTE — OCCUPATIONAL THERAPY INITIAL EVALUATION ADULT - MD ORDER
Occupational Therapy (OT) to evaluate and treat. Occupational Therapy (OT) to evaluate and treat. Ambulate with assistance. Per SUSANNA Hernandez, pt is okay to participate in OT evaluation and perform activity as tolerated.

## 2019-11-13 NOTE — H&P ADULT - NSHPLABSRESULTS_GEN_ALL_CORE
< from: CT Cervical Spine No Cont (11.12.19 @ 23:56) >    HEAD:    There is noCT evidence of acute intracranial hemorrhage, extra-axial   collection, vasogenic edema, mass effect, midline shift, central   herniation, or hydrocephalus.     Cerebral volume loss is present with secondary proportional prominence of   the sulci andventricles. Moderate patchy hypodensity without mass effect   is noted in the periventricular white matter which most likely represents   chronic microvascular ischemic changes given the patient's age.    The visualized paranasal sinuses are clear. The mastoid air cells and   middle ear cavities are clear. Absent right ocular lens.    No significant scalp soft tissue swelling. No acute calvarium fracture.    CERVICAL SPINE:    There is no evidence for acute cervical spine fracture, subluxation, or   prevertebral widening. Cervical vertebral body heights are maintained.   There is acute appearing compression fracture of T1 with fracture line   identified along the anterior and superior aspect of vertebral body with   associated mild widening of C7-T1 disc space is best on image 31 of   series 300. Additional indeterminate age mild compression deformity of T3   with mild retropulsion identified.    There are degenerative changes of the atlantoaxial joint with ligamentous   hypertrophy and bony productive changes. The atlanto-dental interval and   the craniocervical junction are otherwise maintained. The normal cervical   lordosis is maintained.    There are multilevel degenerative changes of the spine, characterized by   disc space height loss, posterior osteophytic ridging disc complexes,   uncovertebral facet hypertrophy which contribute to varying degree of   foraminal and spinal canal stenosis. Canal contents as well as extent of   spondylotic changes are suboptimally evaluated inherent to CT technique   and best assessed with MRI provided no MR contraindications.    Visualized portions of the lungs show no pneumothorax. The surrounding   structures of the neck demonstrate no acute abnormality. Suggestion of   too small to characterize bilateral thyroid lobe nodules for which   nonemergent ultrasound correlation is advised.    IMPRESSION:    Head CT: No evidence for intracranial hemorrhage, mass effect, or   displaced calvarial fracture. Atrophy and chronic microvascularischemic   gliotic changes.    Cervical spine CT: No evidence for acute displaced cervical spine   fracture or traumatic malalignment. Cervical degenerative spondylosis, as   described above. Acute appearing compression fracture of T1 with fracture   line identified along the anterior and superior aspect of vertebral body   with associated mild widening of C7-T1 disc space. Additional   indeterminate age mild compression deformity of T3 with mild retropulsion   identified. MRI can be performed ifthere is concern for subtle osseous,   ligamentous or cord injury provided no MR contraindications.     < end of copied text >

## 2019-11-13 NOTE — OCCUPATIONAL THERAPY INITIAL EVALUATION ADULT - PERTINENT HX OF CURRENT PROBLEM, REHAB EVAL
Pt is an 84 year old female who presented to [facility] on 11/13/19 s/p fall with c/o neck, lower back, and right leg pain. (See continued below)

## 2019-11-13 NOTE — CHART NOTE - NSCHARTNOTEFT_GEN_A_CORE
CAPRINI SCORE [CLOT]    AGE RELATED RISK FACTORS                                                       MOBILITY RELATED FACTORS  [ ] Age 41-60 years                                            (1 Point)                  [x ] Bed rest                                                        (1 Point)  [ ] Age: 61-74 years                                           (2 Points)                 [ ] Plaster cast                                                   (2 Points)  [x] Age= 75 years                                              (3 Points)                 [ ] Bed bound for more than 72 hours                 (2 Points)    DISEASE RELATED RISK FACTORS                                               GENDER SPECIFIC FACTORS  [ ] Edema in the lower extremities                       (1 Point)                  [ ] Pregnancy                                                     (1 Point)  [ ] Varicose veins                                               (1 Point)                  [ ] Post-partum < 6 weeks                                   (1 Point)             [ ] BMI > 25 Kg/m2                                            (1 Point)                  [ ] Hormonal therapy  or oral contraception          (1 Point)                 [ ] Sepsis (in the previous month)                        (1 Point)                  [ ] History of pregnancy complications                 (1 point)  [ ] Pneumonia or serious lung disease                                               [ ] Unexplained or recurrent                     (1 Point)           (in the previous month)                               (1 Point)  [ ] Abnormal pulmonary function test                     (1 Point)                 SURGERY RELATED RISK FACTORS  [ ] Acute myocardial infarction                              (1 Point)                 [ ]  Section                                             (1 Point)  [ ] Congestive heart failure (in the previous month)  (1 Point)               [ ] Minor surgery                                                  (1 Point)   [ ] Inflammatory bowel disease                             (1 Point)                 [ ] Arthroscopic surgery                                        (2 Points)  [ ] Central venous access                                      (2 Points)                [ ] General surgery lasting more than 45 minutes   (2 Points)       [ ] Stroke (in the previous month)                          (5 Points)               [ ] Elective arthroplasty                                         (5 Points)                                                                                                                                               HEMATOLOGY RELATED FACTORS                                                 TRAUMA RELATED RISK FACTORS  [ ] Prior episodes of VTE                                     (3 Points)                [ ] Fracture of the hip, pelvis, or leg                       (5 Points)  [ ] Positive family history for VTE                         (3 Points)                 [ ] Acute spinal cord injury (in the previous month)  (5 Points)  [ ] Prothrombin 97326 A                                     (3 Points)                 [ ] Paralysis  (less than 1 month)                             (5 Points)  [ ] Factor V Leiden                                             (3 Points)                  [ ] Multiple Trauma within 1 month                        (5 Points)  [ ] Lupus anticoagulants                                     (3 Points)                                                           [ ] Anticardiolipin antibodies                               (3 Points)                                                       [ ] High homocysteine in the blood                      (3 Points)                                             [ ] Other congenital or acquired thrombophilia      (3 Points)                                                [ ] Heparin induced thrombocytopenia                  (3 Points)                                          Total Score [    4      ]    Caprini Score 0 - 2:  Low Risk, No VTE Prophylaxis required for most patients, encourage ambulation  Caprini Score 3 - 6:  At Risk, pharmacologic VTE prophylaxis is indicated for most patients (in the absence of a contraindication)  Caprini Score Greater than or = 7:  High Risk, pharmacologic VTE prophylaxis is indicated for most patients (in the absence of a contraindication)

## 2019-11-13 NOTE — OCCUPATIONAL THERAPY INITIAL EVALUATION ADULT - RANGE OF MOTION EXAMINATION, UPPER EXTREMITY
except Bilateral Shoulder Flexion Active ROM 0-80 degrees/bilateral UE Active ROM was WFL  (within functional limits)

## 2019-11-13 NOTE — OCCUPATIONAL THERAPY INITIAL EVALUATION ADULT - GENERAL OBSERVATIONS, REHAB EVAL
Pt. received semisupine in bed. No acute distress. Patient agreed to evaluation from Occupational Therapist. +Cervical Collar. 1:1 present bedside.

## 2019-11-13 NOTE — PHYSICAL THERAPY INITIAL EVALUATION ADULT - PERTINENT HX OF CURRENT PROBLEM, REHAB EVAL
84 year old female fell in the bathroom of her assisted living facility, striking her head on the wall. She denies LOC, but C/O neck, lower back, and right leg pain.

## 2019-11-13 NOTE — H&P ADULT - NSHPPHYSICALEXAM_GEN_ALL_CORE
WDWN female in NAD  Vital Signs Last 24 Hrs  T(C): 36.7 (12 Nov 2019 22:21), Max: 36.7 (12 Nov 2019 22:21)  T(F): 98.1 (12 Nov 2019 22:21), Max: 98.1 (12 Nov 2019 22:21)  HR: 97 (12 Nov 2019 22:21) (97 - 97)  BP: 122/61 (12 Nov 2019 22:21) (122/61 - 122/61)  BP(mean): --  RR: 15 (12 Nov 2019 22:21) (15 - 15)  SpO2: 96% (12 Nov 2019 22:21) (96% - 96%)    AAO to self and hospital  PERRLA, EOMI  CN 2-12 grossly intact  STREET strength 5/5  SILT    Mild midline tenderness over mid cervical and upper thoracic spine, no bony deformity

## 2019-11-13 NOTE — PHYSICAL THERAPY INITIAL EVALUATION ADULT - GENERAL OBSERVATIONS, REHAB EVAL
Patient received supine in bed; No apparent distress. (+) heplock. Patient not wearing cervical collar properly; donned by therapist prior to bed mobility

## 2019-11-13 NOTE — H&P ADULT - ASSESSMENT
83 YO female with compression deformities of C5, T1 and widening of the C7-T1 disk space following a fall.

## 2019-11-13 NOTE — H&P ADULT - HISTORY OF PRESENT ILLNESS
85 YO female fell in the bathroom of her assisted living facility, striking her head on the wall. She denies LOC, but C/O neck, lower back, and right leg pain. Denies any focal motor or sensory loss
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
## 2019-11-13 NOTE — OCCUPATIONAL THERAPY INITIAL EVALUATION ADULT - LIVES WITH, PROFILE
Pt. unable to provide Social History secondary to noted confusion with difficulty following commands. Per chart, pt. lives at [facility]

## 2019-11-13 NOTE — PHYSICAL THERAPY INITIAL EVALUATION ADULT - MD/RN NOTIFIED
Anesthetic History PONV Review of Systems / Medical History Patient summary reviewed and pertinent labs reviewed Pulmonary Within defined limits Neuro/Psych Within defined limits Cardiovascular CAD and cardiac stents Exercise tolerance: >4 METS 
  
GI/Hepatic/Renal 
Within defined limits Endo/Other Morbid obesity Other Findings Comments:  
 
 
  
 
 
 
 
Physical Exam 
 
Airway Mallampati: III 
TM Distance: < 4 cm Neck ROM: normal range of motion Mouth opening: Normal 
 
 Cardiovascular Regular rate and rhythm,  S1 and S2 normal,  no murmur, click, rub, or gallop Rhythm: regular Rate: normal 
 
 
 
 Dental 
No notable dental hx Pulmonary Breath sounds clear to auscultation Abdominal 
GI exam deferred Other Findings Anesthetic Plan ASA: 3 Anesthesia type: general 
 
 
 
 
Induction: Intravenous Anesthetic plan and risks discussed with: Patient
yes

## 2019-11-14 ENCOUNTER — TRANSCRIPTION ENCOUNTER (OUTPATIENT)
Age: 84
End: 2019-11-14

## 2019-11-14 VITALS
OXYGEN SATURATION: 98 % | HEART RATE: 100 BPM | TEMPERATURE: 98 F | DIASTOLIC BLOOD PRESSURE: 49 MMHG | RESPIRATION RATE: 17 BRPM | SYSTOLIC BLOOD PRESSURE: 110 MMHG

## 2019-11-14 PROCEDURE — 99221 1ST HOSP IP/OBS SF/LOW 40: CPT

## 2019-11-14 RX ORDER — ACETAMINOPHEN 500 MG
2 TABLET ORAL
Qty: 0 | Refills: 0 | DISCHARGE
Start: 2019-11-14

## 2019-11-14 RX ORDER — ALBUTEROL 90 UG/1
2 AEROSOL, METERED ORAL
Qty: 0 | Refills: 0 | DISCHARGE
Start: 2019-11-14

## 2019-11-14 RX ADMIN — SODIUM CHLORIDE 3 MILLILITER(S): 9 INJECTION INTRAMUSCULAR; INTRAVENOUS; SUBCUTANEOUS at 13:24

## 2019-11-14 RX ADMIN — SODIUM CHLORIDE 3 MILLILITER(S): 9 INJECTION INTRAMUSCULAR; INTRAVENOUS; SUBCUTANEOUS at 06:36

## 2019-11-14 NOTE — DISCHARGE NOTE NURSING/CASE MANAGEMENT/SOCIAL WORK - NSDCPECAREGIVERED_GEN_ALL_CORE
Medline and carenotes for fractures as well as DC Medications and side effects literature for patient reference./Yes

## 2019-11-14 NOTE — DISCHARGE NOTE PROVIDER - NSDCMRMEDTOKEN_GEN_ALL_CORE_FT
acetaminophen 325 mg oral tablet: 2 tab(s) orally every 6 hours, As needed, Temp greater or equal to 38C (100.4F), Mild Pain (1 - 3)  albuterol 90 mcg/inh inhalation aerosol: 2 puff(s) inhaled every 6 hours, As needed, Shortness of Breath and/or Wheezing

## 2019-11-14 NOTE — PROGRESS NOTE ADULT - SUBJECTIVE AND OBJECTIVE BOX
SUBJECTIVE EVENTS: no events reported, denies neck pain    Vital Signs Last 24 Hrs  T(C): 36.9 (13 Nov 2019 21:25), Max: 37.2 (13 Nov 2019 05:37)  T(F): 98.4 (13 Nov 2019 21:25), Max: 98.9 (13 Nov 2019 05:37)  HR: 89 (13 Nov 2019 21:25) (80 - 101)  BP: 122/65 (13 Nov 2019 21:25) (111/55 - 147/74)  BP(mean): --  RR: 16 (13 Nov 2019 21:25) (16 - 16)  SpO2: 97% (13 Nov 2019 21:25) (94% - 97%)      PHYSICAL EXAM:  Awake Alert, fc speech clear  PERRL, EOMI, No facial droop, Tongue midline  STREET antigravity        DIET: regular      MEDICATIONS  (STANDING):  heparin  Injectable 5000 Unit(s) SubCutaneous every 12 hours  sodium chloride 0.9% lock flush 3 milliLiter(s) IV Push every 8 hours    MEDICATIONS  (PRN):  acetaminophen   Tablet .. 650 milliGRAM(s) Oral every 6 hours PRN Temp greater or equal to 38C (100.4F), Mild Pain (1 - 3)  ALBUTerol    90 MICROgram(s) HFA Inhaler 2 Puff(s) Inhalation every 6 hours PRN Shortness of Breath and/or Wheezing                            8.7    10.66 )-----------( 112      ( 13 Nov 2019 04:30 )             28.8   11-13    136  |  101  |  15  ----------------------------<  105<H>  3.9   |  24  |  0.40<L>    Ca    7.6<L>      13 Nov 2019 04:30    TPro  5.4<L>  /  Alb  2.4<L>  /  TBili  0.3  /  DBili  x   /  AST  15  /  ALT  11  /  AlkPhos  111  11-13  PT/INR - ( 13 Nov 2019 04:30 )   PT: 12.0 SEC;   INR: 1.08          PTT - ( 13 Nov 2019 04:30 )  PTT:33.0 SEC      RADIOLGY: Head CT: No evidence for intracranial hemorrhage, mass effect, or   displaced calvarial fracture. Atrophy and chronic microvascularischemic   gliotic changes.    Cervical spine CT: No evidence for acute displaced cervical spine   fracture or traumatic malalignment. Cervical degenerative spondylosis, as   described above. Acute appearing compression fracture of T1 with fracture   line identified along the anterior and superior aspect of vertebral body   with associated mild widening of C7-T1 disc space. Additional   indeterminate age mild compression deformity of T3 with mild retropulsion   identified. MRI can be performed ifthere is concern for subtle osseous,   ligamentous or cord injury provided no MR contraindications.

## 2019-11-14 NOTE — DISCHARGE NOTE PROVIDER - NSDCCPCAREPLAN_GEN_ALL_CORE_FT
PRINCIPAL DISCHARGE DIAGNOSIS  Diagnosis: Compression fracture of C5 vertebra  Assessment and Plan of Treatment: Follow up with Dr. Hackett in 4-6 weeks for MRI. Pain medication as needed. Use caution with ambulation.      SECONDARY DISCHARGE DIAGNOSES  Diagnosis: Compression fracture of T1 vertebra  Assessment and Plan of Treatment: Follow up with Dr. Hackett in 4-6 weeks for MRI. Pain medication as needed. Use caution with ambulation.

## 2019-11-14 NOTE — CONSULT NOTE ADULT - ATTENDING COMMENTS
agree with note and plan above  patient has history of paranoid dementia, and is currently showing symptoms. reports she sent her breakfast back but did not get new tray. Per resident patient's tray was seen eaten at bedside early this morning. Pt states she has the rights to unopened products.  agree with plan for subacute rehab when medically cleared.

## 2019-11-14 NOTE — DISCHARGE NOTE PROVIDER - HOSPITAL COURSE
HPI:    85 YO female with dementia, asthma, and paranoia fell in the bathroom of her assisted living facility, striking her head on the wall. She denies LOC, but C/O neck, lower back, and right leg pain. Denies any focal motor or sensory loss. Noncontrast CT head revealed no acute pathology. CT cervical spine illustrated No evidence for acute displaced cervical spine fracture or traumatic malalignment. Cervical degenerative spondylosis, Acute appearing compression fracture of T1 with fracture line identified along the anterior and superior aspect of vertebral body  with associated mild widening of C7-T1 disc space. Additional indeterminate age mild compression deformity of T3 with mild retropulsion identified. Pt admitted for neurosurgical observation. Placed in C Collar and PT/OT consulted for recommendations. C collar found to be removed morning of 11/14/19. Pt refused to have collar placed back on. Pt tolerating pain well. MRI can be done outpatient, follow up with Dr. Hackett reccomended.          Pt is medically ready and stable for safe discharge to assisted living facility. HPI:    85 YO female with dementia, asthma, and paranoia fell in the bathroom of her assisted living facility, striking her head on the wall. She denies LOC, but C/O neck, lower back, and right leg pain. Denies any focal motor or sensory loss. Noncontrast CT head revealed no acute pathology. CT cervical spine illustrated No evidence for acute displaced cervical spine fracture or traumatic malalignment. Cervical degenerative spondylosis, Acute appearing compression fracture of T1 with fracture line identified along the anterior and superior aspect of vertebral body with associated mild widening of C7-T1 disc space. Additional indeterminate age mild compression deformity of T3 with mild retropulsion identified. Pt admitted for neurosurgical observation. Placed in C Collar. C collar found to be removed morning of 11/14/19. Pt refused to have collar placed back on. Pt tolerating pain well.         MRI can be done outpatient, follow up with Dr. Hackett is reccomended.          Pt is medically ready and stable for safe discharge to assisted living facility.

## 2019-11-14 NOTE — DISCHARGE NOTE PROVIDER - REASON FOR ADMISSION
Cervical and thoracic spine compression fractures. Cervical and thoracic spine compression fractures

## 2019-11-14 NOTE — CONSULT NOTE ADULT - SUBJECTIVE AND OBJECTIVE BOX
84yF was admitted on 11-13    In ED, GCS=    Patient is a 84y old  Female who presents with a chief complaint of Cervical and thoracic spine compression fractures (14 Nov 2019 00:07)    HPI:  84F with PMH of asthma, dementia paranoia who presented to [facility] on 11/13 after fell in the bathroom of her assisted living facility, striking her head on the wall. She denies LOC, but C/O neck, lower back, and right leg pain. Denies any focal motor or sensory loss (13 Nov 2019 03:51)      Imaging showed:  HEAD CT - No acute findings  CAP CT - No acute findings  C SPINE CT - No acute findings    REVIEW OF SYSTEMS  Constitutional - No fever, No weight loss, No fatigue  HEENT - No eye pain, No visual disturbances, No difficulty hearing, No tinnitus, No vertigo, No neck pain  Respiratory - No cough, No wheezing, No shortness of breath  Cardiovascular - No chest pain, No palpitations  Gastrointestinal - No abdominal pain, No nausea, No vomiting, No diarrhea, No constipation  Genitourinary - No dysuria, No frequency, No hematuria, No incontinence  Neurological - No headaches, No memory loss, No loss of strength, No numbness, No tremors  Skin - No itching, No rashes, No lesions   Endocrine - No temperature intolerance  Musculoskeletal - No joint pain, No joint swelling, No muscle pain  Psychiatric - No depression, No anxiety    VITALS  T(C): 36.6 (11-14-19 @ 06:42), Max: 36.9 (11-13-19 @ 21:25)  HR: 77 (11-14-19 @ 06:42) (76 - 101)  BP: 118/68 (11-14-19 @ 06:42) (111/55 - 147/74)  RR: 19 (11-14-19 @ 06:42) (16 - 19)  SpO2: 97% (11-14-19 @ 06:42) (94% - 97%)  Wt(kg): --    PAST MEDICAL & SURGICAL HISTORY  Asthma  Dementia  Paranoia      SOCIAL HISTORY  Smoking - Denied  EtOH - Denied   Drugs - Denied    FUNCTIONAL HISTORY  Lives   Independent    CURRENT FUNCTIONAL STATUS      FAMILY HISTORY       RECENT LABS/IMAGING  CBC Full  -  ( 13 Nov 2019 04:30 )  WBC Count : 10.66 K/uL  RBC Count : 3.10 M/uL  Hemoglobin : 8.7 g/dL  Hematocrit : 28.8 %  Platelet Count - Automated : 112 K/uL  Mean Cell Volume : 92.9 fL  Mean Cell Hemoglobin : 28.1 pg  Mean Cell Hemoglobin Concentration : 30.2 %  Auto Neutrophil # : x  Auto Lymphocyte # : x  Auto Monocyte # : x  Auto Eosinophil # : x  Auto Basophil # : x  Auto Neutrophil % : x  Auto Lymphocyte % : x  Auto Monocyte % : x  Auto Eosinophil % : x  Auto Basophil % : x    11-13    136  |  101  |  15  ----------------------------<  105<H>  3.9   |  24  |  0.40<L>    Ca    7.6<L>      13 Nov 2019 04:30    TPro  5.4<L>  /  Alb  2.4<L>  /  TBili  0.3  /  DBili  x   /  AST  15  /  ALT  11  /  AlkPhos  111  11-13        ALLERGIES  penicillin (Other)      MEDICATIONS   acetaminophen   Tablet .. 650 milliGRAM(s) Oral every 6 hours PRN  ALBUTerol    90 MICROgram(s) HFA Inhaler 2 Puff(s) Inhalation every 6 hours PRN  heparin  Injectable 5000 Unit(s) SubCutaneous every 12 hours  sodium chloride 0.9% lock flush 3 milliLiter(s) IV Push every 8 hours HPI:  84F with PMH of asthma, dementia, paranoia who presented to [facility]  after fall in the bathroom of her assisted living facility, striking her head on the wall. Patient is a poor historian and history obtained from chart review. On admission,  patient denied LOC, but complained of neck, lower back and right leg pain. Imaging demonstrated acute compression fracture of T1 and age indeterminate compression deformity of T3 with findings as below. Neurosurgery was consulted and recommended continued monitoring.     Upon evaluation, patient confused. Reporting that she is going to the hairdresser on the ground floor. Reports that she has some neck and back pain. Reports that she used to have chest pain and SOB, but no longer denies symptoms.       Imaging showed:  CTH and CSpine 11/12/19  IMPRESSION:    Head CT: No evidence for intracranial hemorrhage, mass effect, or   displaced calvarial fracture. Atrophy and chronic microvascular ischemic   gliotic changes.    Cervical spine CT: No evidence for acute displaced cervical spine   fracture or traumatic malalignment. Cervical degenerative spondylosis, as   described above. Acute appearing compression fracture of T1 with fracture   line identified along the anterior and superior aspect of vertebral body   with associated mild widening of C7-T1 disc space. Additional   indeterminate age mild compression deformity of T3 with mild retropulsion   identified. MRI can be performed if there is concern for subtle osseous,   ligamentous or cord injury provided no MR contraindications.     REVIEW OF SYSTEMS  Constitutional - No fever, No fatigue  HEENT - No eye pain, No visual disturbances, No difficulty hearing, No tinnitus, + neck pain  Respiratory - No cough, No wheezing, No shortness of breath  Cardiovascular - No chest pain, No palpitations  Gastrointestinal - No abdominal pain, No nausea, No vomiting, No diarrhea, No constipation  Genitourinary - No dysuria, No frequency, No hematuria  Neurological - No headaches, +confusion, +generalized weakness, No numbness  Skin - +ecchymosis  Endocrine - No temperature intolerance  Musculoskeletal - +joint pain, +muscle pain  Psychiatric - + anxiety    VITALS  T(C): 36.6 (11-14-19 @ 06:42), Max: 36.9 (11-13-19 @ 21:25)  HR: 77 (11-14-19 @ 06:42) (76 - 101)  BP: 118/68 (11-14-19 @ 06:42) (111/55 - 147/74)  RR: 19 (11-14-19 @ 06:42) (16 - 19)  SpO2: 97% (11-14-19 @ 06:42) (94% - 97%)  Wt(kg): --    PAST MEDICAL & SURGICAL HISTORY  Asthma  Dementia  Paranoia      FUNCTIONAL HISTORY  Patient is a poor historian and reports that she lives alone. Per chart review, patient lives in [facility]. Reports that she was independent with ADLs and mobility with cane/RW      CURRENT FUNCTIONAL STATUS 11/13/19  Bed Mobility: Sit to Supine:     · Level of [place]	minimum assist (75% patients effort)	  · Physical Assist/Nonphysical Assist	1 person assist; verbal cues	    Bed Mobility: Supine to Sit:     · Level of [place]	minimum assist (75% patients effort)	  · Physical Assist/Nonphysical Assist	1 person assist; verbal cues	    Transfer: Sit to Stand:     · Level of [place]	minimum assist (75% patients effort)	  · Physical Assist/Nonphysical Assist	2 person assist; verbal cues	  · Weight-Bearing Restrictions	weight-bearing as tolerated	  · Assistive Device	Hand Held Assist	    Transfer: Stand to Sit:     · Level of [place]	minimum assist (75% patients effort)	  · Physical Assist/Nonphysical Assist	2 person assist; verbal cues	  · Weight-Bearing Restrictions	weight-bearing as tolerated	  · Assistive Device	Hand Held Assist	    Sit/Stand Transfer Safety Analysis:     · Transfer Safety Concerns Noted	decreased weight-shifting ability; decreased step length; losing balance	  · Impairments Contributing to Impaired Transfers	impaired balance; cognition; decreased strength; decreased ROM	    Gait Skills:     · Level of [place]	minimum assist (75% patients effort)	  · Physical Assist/Nonphysical Assist	2 person assist; verbal cues	  · Weight-Bearing Restrictions	weight-bearing as tolerated	  · Assistive Device	Hand Held Assist	  · Gait Distance	5 feet	          FAMILY HISTORY   non-contributory      RECENT LABS/IMAGING  CBC Full  -  ( 13 Nov 2019 04:30 )  WBC Count : 10.66 K/uL  RBC Count : 3.10 M/uL  Hemoglobin : 8.7 g/dL  Hematocrit : 28.8 %  Platelet Count - Automated : 112 K/uL  Mean Cell Volume : 92.9 fL  Mean Cell Hemoglobin : 28.1 pg  Mean Cell Hemoglobin Concentration : 30.2 %  Auto Neutrophil # : x  Auto Lymphocyte # : x  Auto Monocyte # : x  Auto Eosinophil # : x  Auto Basophil # : x  Auto Neutrophil % : x  Auto Lymphocyte % : x  Auto Monocyte % : x  Auto Eosinophil % : x  Auto Basophil % : x    11-13    136  |  101  |  15  ----------------------------<  105<H>  3.9   |  24  |  0.40<L>    Ca    7.6<L>      13 Nov 2019 04:30    TPro  5.4<L>  /  Alb  2.4<L>  /  TBili  0.3  /  DBili  x   /  AST  15  /  ALT  11  /  AlkPhos  111  11-13        ALLERGIES  penicillin (Other)      MEDICATIONS   acetaminophen   Tablet .. 650 milliGRAM(s) Oral every 6 hours PRN  ALBUTerol    90 MICROgram(s) HFA Inhaler 2 Puff(s) Inhalation every 6 hours PRN  heparin  Injectable 5000 Unit(s) SubCutaneous every 12 hours  sodium chloride 0.9% lock flush 3 milliLiter(s) IV Push every 8 hours    ----------------------------------------------------------------------------------------  PHYSICAL EXAM  Constitutional - NAD, Comfortable  HEENT - NCAT, EOMI  Chest - Breathing comfortably on room air, symmetrical chest rise  Cardiovascular - Warm, well perfused  Abdomen - Soft, NTND  Extremities - No C/C/E, No calf tenderness   Neurologic Exam -                    Cognitive - Awake, Alert, oriented to self. Reports place as "hospital" and situation as "going to the [facility]". Does not know date     Communication - Fluent    Cranial Nerves - CN 2-12 intact     Motor -                     LEFT    UE - ShAB 5/5, EF 5/5, EE 5/5, WE 5/5,  5/5                    RIGHT UE - ShAB 5/5, EF 5/5, EE 5/5, WE 5/5,  5/5                    LEFT    LE - HF 4+/5, KE 5/5, DF 5/5, PF 5/5                    RIGHT LE - HF 4/5, KE 5/5, DF 5/5, PF 5/5        Sensory - Intact to LT     Reflexes - Negative Babinski BL     Coordination - FTN intact     Balance - WNL Static  Psychiatric - Mood stable, Affect WNL HPI:  84F with PMH of asthma, dementia, paranoia who presented to [facility]  after fall in the bathroom of her assisted living facility, striking her head on the wall. Patient is a poor historian and history obtained from chart review. On admission,  patient denied LOC, but complained of neck, lower back and right leg pain. Imaging demonstrated acute compression fracture of T1 and age indeterminate compression deformity of T3 with findings as below. Neurosurgery was consulted and recommended continued monitoring.     Upon evaluation, patient confused. Reporting that she is going to the hairdresser on the ground floor. Reports that she has some neck and back pain. Reports that she used to have chest pain and SOB, but no longer has symptoms.       Imaging showed:  CTH and CSpine 11/12/19  IMPRESSION:    Head CT: No evidence for intracranial hemorrhage, mass effect, or   displaced calvarial fracture. Atrophy and chronic microvascular ischemic   gliotic changes.    Cervical spine CT: No evidence for acute displaced cervical spine   fracture or traumatic malalignment. Cervical degenerative spondylosis, as   described above. Acute appearing compression fracture of T1 with fracture   line identified along the anterior and superior aspect of vertebral body   with associated mild widening of C7-T1 disc space. Additional   indeterminate age mild compression deformity of T3 with mild retropulsion   identified. MRI can be performed if there is concern for subtle osseous,   ligamentous or cord injury provided no MR contraindications.     REVIEW OF SYSTEMS  Constitutional - No fever, No fatigue  HEENT - No eye pain, No visual disturbances, No difficulty hearing, No tinnitus, + neck pain  Respiratory - No cough, No wheezing, No shortness of breath  Cardiovascular - No chest pain, No palpitations  Gastrointestinal - No abdominal pain, No nausea, No vomiting, No diarrhea, No constipation  Genitourinary - No dysuria, No frequency, No hematuria  Neurological - No headaches, +confusion, +generalized weakness, No numbness  Skin - +ecchymosis  Endocrine - No temperature intolerance  Musculoskeletal - +joint pain, +muscle pain  Psychiatric - + anxiety    VITALS  T(C): 36.6 (11-14-19 @ 06:42), Max: 36.9 (11-13-19 @ 21:25)  HR: 77 (11-14-19 @ 06:42) (76 - 101)  BP: 118/68 (11-14-19 @ 06:42) (111/55 - 147/74)  RR: 19 (11-14-19 @ 06:42) (16 - 19)  SpO2: 97% (11-14-19 @ 06:42) (94% - 97%)  Wt(kg): --    PAST MEDICAL & SURGICAL HISTORY  Asthma  Dementia  Paranoia      FUNCTIONAL HISTORY  Patient is a poor historian and reports that she lives alone. Per chart review, patient lives in [facility]. Reports that she was independent with ADLs and mobility with cane/RW      CURRENT FUNCTIONAL STATUS 11/13/19  Bed Mobility: Sit to Supine:     · Level of [place]	minimum assist (75% patients effort)	  · Physical Assist/Nonphysical Assist	1 person assist; verbal cues	    Bed Mobility: Supine to Sit:     · Level of [place]	minimum assist (75% patients effort)	  · Physical Assist/Nonphysical Assist	1 person assist; verbal cues	    Transfer: Sit to Stand:     · Level of [place]	minimum assist (75% patients effort)	  · Physical Assist/Nonphysical Assist	2 person assist; verbal cues	  · Weight-Bearing Restrictions	weight-bearing as tolerated	  · Assistive Device	Hand Held Assist	    Transfer: Stand to Sit:     · Level of [place]	minimum assist (75% patients effort)	  · Physical Assist/Nonphysical Assist	2 person assist; verbal cues	  · Weight-Bearing Restrictions	weight-bearing as tolerated	  · Assistive Device	Hand Held Assist	    Sit/Stand Transfer Safety Analysis:     · Transfer Safety Concerns Noted	decreased weight-shifting ability; decreased step length; losing balance	  · Impairments Contributing to Impaired Transfers	impaired balance; cognition; decreased strength; decreased ROM	    Gait Skills:     · Level of [place]	minimum assist (75% patients effort)	  · Physical Assist/Nonphysical Assist	2 person assist; verbal cues	  · Weight-Bearing Restrictions	weight-bearing as tolerated	  · Assistive Device	Hand Held Assist	  · Gait Distance	5 feet	          FAMILY HISTORY   non-contributory      RECENT LABS/IMAGING  CBC Full  -  ( 13 Nov 2019 04:30 )  WBC Count : 10.66 K/uL  RBC Count : 3.10 M/uL  Hemoglobin : 8.7 g/dL  Hematocrit : 28.8 %  Platelet Count - Automated : 112 K/uL  Mean Cell Volume : 92.9 fL  Mean Cell Hemoglobin : 28.1 pg  Mean Cell Hemoglobin Concentration : 30.2 %  Auto Neutrophil # : x  Auto Lymphocyte # : x  Auto Monocyte # : x  Auto Eosinophil # : x  Auto Basophil # : x  Auto Neutrophil % : x  Auto Lymphocyte % : x  Auto Monocyte % : x  Auto Eosinophil % : x  Auto Basophil % : x    11-13    136  |  101  |  15  ----------------------------<  105<H>  3.9   |  24  |  0.40<L>    Ca    7.6<L>      13 Nov 2019 04:30    TPro  5.4<L>  /  Alb  2.4<L>  /  TBili  0.3  /  DBili  x   /  AST  15  /  ALT  11  /  AlkPhos  111  11-13        ALLERGIES  penicillin (Other)      MEDICATIONS   acetaminophen   Tablet .. 650 milliGRAM(s) Oral every 6 hours PRN  ALBUTerol    90 MICROgram(s) HFA Inhaler 2 Puff(s) Inhalation every 6 hours PRN  heparin  Injectable 5000 Unit(s) SubCutaneous every 12 hours  sodium chloride 0.9% lock flush 3 milliLiter(s) IV Push every 8 hours    ----------------------------------------------------------------------------------------  PHYSICAL EXAM  Constitutional - NAD, Comfortable  HEENT - NCAT, EOMI  Chest - Breathing comfortably on room air, symmetrical chest rise  Cardiovascular - Warm, well perfused  Abdomen - Soft, NTND  Extremities - No C/C/E, No calf tenderness   Neurologic Exam -                    Cognitive - Awake, Alert, oriented to self. Reports place as "hospital" and situation as "going to the [facility]". Does not know date     Communication - Fluent    Cranial Nerves - CN 2-12 intact     Motor -                     LEFT    UE - ShAB 5/5, EF 5/5, EE 5/5, WE 5/5,  5/5                    RIGHT UE - ShAB 5/5, EF 5/5, EE 5/5, WE 5/5,  5/5                    LEFT    LE - HF 4+/5, KE 5/5, DF 5/5, PF 5/5                    RIGHT LE - HF 4/5, KE 5/5, DF 5/5, PF 5/5        Sensory - Intact to LT     Reflexes - Negative Babinski BL     Coordination - FTN intact     Balance - WNL Static  Psychiatric - Mood stable, Affect WNL

## 2019-11-14 NOTE — DISCHARGE NOTE NURSING/CASE MANAGEMENT/SOCIAL WORK - NSDPDISTO_GEN_ALL_CORE
Pt awake alert, hx dementia. Refusing Cervical collar. Encouragement given. positive NV status. VS stable Afebrile. pt macie po diet, voiding Seen by MD and cleared for Dc and return to [facility] as per safe dc plan./Assisted living

## 2019-11-14 NOTE — DISCHARGE NOTE NURSING/CASE MANAGEMENT/SOCIAL WORK - PATIENT PORTAL LINK FT
You can access the FollowMyHealth Patient Portal offered by [facility] by registering at the following website: [email or web address]. By joining [employer]’s FollowMyHealth portal, you will also be able to view your health information using other applications (apps) compatible with our system.

## 2019-11-14 NOTE — DISCHARGE NOTE PROVIDER - CARE PROVIDER_API CALL
Allegra Hackett)  [facility] Neurosurgery  General  [address], [address]  [place] [address]  Phone: [phone]  Fax: [phone]  Follow Up Time: 1 month

## 2019-11-14 NOTE — CONSULT NOTE ADULT - ASSESSMENT
------------------------------------------------------------------------------------------------  ASSESSMENT/PLAN  84yFemale with functional deficits after fall found to have T1 and T3 compression fractures  Compression fractures - pain control, mobilize as tolerated  Pain - Tylenol  DVT PPX - Heparin, SCDs  PT- ROM, Bed Mob, Transfers, Amb w AD and bracing as needed  OT- ADLs, bracing as needed  Prec- Falls  Skin- Turn q2 h  Dispo -  Recommend [facility], patient DOES NOT meet acute inpatient rehabilitation criteria. FINAL DISPO RECS INCOMPLETE UNTIL ATTENDING ROUNDS ------------------------------------------------------------------------------------------------  ASSESSMENT/PLAN  84yFemale with functional deficits after fall found to have T1 and T3 compression fractures  Compression fractures - pain control, mobilize as tolerated  Pain - Tylenol  DVT PPX - Heparin, SCDs  PT- ROM, Bed Mob, Transfers, Amb w AD and bracing as needed  OT- ADLs, bracing as needed  Prec- Falls  Skin- Turn q2 h  Dispo -  Recommend [facility], patient DOES NOT meet acute inpatient rehabilitation criteria.

## 2019-11-14 NOTE — DISCHARGE NOTE NURSING/CASE MANAGEMENT/SOCIAL WORK - NSDCFUADDAPPT_GEN_ALL_CORE_FT
Follow-up with Dr. Hackett in the office as instructed for post-op check as well as with PMD for continuity of care.

## 2020-12-07 NOTE — PROGRESS NOTE ADULT - PROBLEM SELECTOR PLAN 7
1) PCP Contacted on Admission: (Y/N) --> Name & Phone #: Dr. Al  2) Date of Contact with PCP:  3) PCP Contacted at Discharge: (Y/N, N/A)  4) Summary of Handoff Given to PCP:   5) Post-Discharge Appointment Date and Location: F: NS 40cc/hr  E: Replete prn   N: NPO for possible GI procedure Dressing: pressure dressing with telfa

## 2021-03-24 NOTE — CONSULT NOTE ADULT - CONSULT REQUESTED DATE/TIME
I have reviewed the history, physical exam, assessment and management plans.  I concur with or have edited all elements of her note., Patient's visit was conducted through video telecommunication. Patient consented before the start of visit as to understanding of privacy concerns, possible technological failure, and their responsibility of carrying out instructions of plan.
14-Nov-2019 09:26

## 2021-07-04 NOTE — PATIENT PROFILE ADULT - HAVE YOU EXPERIENCED A TRAUMATIC EVENT?
PHYSICIAN NEXT STEPS:  Review Only    CHIEF COMPLAINT:  Chief Complaint/Protocol Used: No Contact or Duplicate Contact Call  Onset: dropped call      ASSESSMENT:  ? Onset: dropped call  -------------------------------------------------------    DISPOSITION:  Disposition Recommendation: No Contact Calls  Questions that led to disposition:  ? Message left on unidentified voice mail. Phone number verified.  Patient Directed To: Unspecified  Patient Intended Action: Other      CALL NOTES:  07/04/2021 at 10:43 AM by Doe Mora  ? nurse was talking to mother of patient, call dropped, attempted to call back x 2, no answer, message left on voice message machine    DISPOSITION OVERRIDE/PROVIDER CONSULT:  Disposition Override: N/A  Override Source: Unspecified  Consulted with PCP: No  Consulted with On-Call Physician: No    CALLER CONTACT INFO:  Name: Earnest Jiménez (Self)  Phone 1: [phone] (Work Phone)  Phone 2: [phone] (Home Phone)  Phone 3: [phone] (Mobile)      ENCOUNTER STARTED:  07/04/21 10:39:49 AM  ENCOUNTER ASSIGNED TO/CLOSED BY:  Doe Mora @ 07/04/21 10:43:30 AM      -------------------------------------------------------    UNDERSTANDS CARE ADVICE: Yes    AGREES WITH CARE ADVICE: Yes    WILL FOLLOW CARE ADVICE: Yes    -------------------------------------------------------   no

## 2021-11-03 NOTE — BEHAVIORAL HEALTH ASSESSMENT NOTE - ABUSE / TRAUMA HISTORY
Patient received discharge papers. IV out and vitals stable at this time of discharge. Patient has no pain at this time. Patient ambulatory to waiting room to wait for ride. No

## 2022-01-31 NOTE — H&P ADULT - NSICDXPASTSURGICALHX_GEN_ALL_CORE_FT
Ahsan Kam PAST SURGICAL HISTORY:  History of hip replacement, total, right

## 2024-05-31 NOTE — ED ADULT NURSE NOTE - CAS TRG GENERAL NORM CIRC DET
Detail Level: Generalized Patient Specific Counseling (Will Not Stick From Patient To Patient): /// Detail Level: Detailed Strong peripheral pulses
